# Patient Record
Sex: MALE | Race: OTHER | URBAN - METROPOLITAN AREA
[De-identification: names, ages, dates, MRNs, and addresses within clinical notes are randomized per-mention and may not be internally consistent; named-entity substitution may affect disease eponyms.]

---

## 2019-04-03 ENCOUNTER — EMERGENCY (EMERGENCY)
Facility: HOSPITAL | Age: 34
LOS: 0 days | Discharge: HOME | End: 2019-04-03
Attending: EMERGENCY MEDICINE | Admitting: EMERGENCY MEDICINE
Payer: MEDICAID

## 2019-04-03 VITALS
SYSTOLIC BLOOD PRESSURE: 136 MMHG | OXYGEN SATURATION: 100 % | HEART RATE: 65 BPM | TEMPERATURE: 98 F | DIASTOLIC BLOOD PRESSURE: 71 MMHG | RESPIRATION RATE: 18 BRPM

## 2019-04-03 VITALS — WEIGHT: 160.06 LBS | HEIGHT: 62 IN

## 2019-04-03 DIAGNOSIS — M54.5 LOW BACK PAIN: ICD-10-CM

## 2019-04-03 DIAGNOSIS — N30.00 ACUTE CYSTITIS WITHOUT HEMATURIA: ICD-10-CM

## 2019-04-03 LAB
APPEARANCE UR: CLEAR — SIGNIFICANT CHANGE UP
BILIRUB UR-MCNC: NEGATIVE — SIGNIFICANT CHANGE UP
COLOR SPEC: YELLOW — SIGNIFICANT CHANGE UP
DIFF PNL FLD: NEGATIVE — SIGNIFICANT CHANGE UP
GLUCOSE UR QL: NEGATIVE MG/DL — SIGNIFICANT CHANGE UP
KETONES UR-MCNC: NEGATIVE — SIGNIFICANT CHANGE UP
LEUKOCYTE ESTERASE UR-ACNC: ABNORMAL
NITRITE UR-MCNC: POSITIVE
PH UR: 5 — SIGNIFICANT CHANGE UP (ref 5–8)
PROT UR-MCNC: 15 MG/DL
SP GR SPEC: 1.02 — SIGNIFICANT CHANGE UP (ref 1.01–1.02)
UROBILINOGEN FLD QL: 1 MG/DL

## 2019-04-03 PROCEDURE — 99284 EMERGENCY DEPT VISIT MOD MDM: CPT | Mod: 25

## 2019-04-03 RX ORDER — CEFTRIAXONE 500 MG/1
250 INJECTION, POWDER, FOR SOLUTION INTRAMUSCULAR; INTRAVENOUS ONCE
Qty: 0 | Refills: 0 | Status: COMPLETED | OUTPATIENT
Start: 2019-04-03 | End: 2019-04-03

## 2019-04-03 RX ORDER — CYCLOBENZAPRINE HYDROCHLORIDE 10 MG/1
1 TABLET, FILM COATED ORAL
Qty: 5 | Refills: 0
Start: 2019-04-03 | End: 2019-04-07

## 2019-04-03 RX ORDER — IBUPROFEN 200 MG
1 TABLET ORAL
Qty: 20 | Refills: 0
Start: 2019-04-03 | End: 2019-04-07

## 2019-04-03 RX ORDER — LIDOCAINE 4 G/100G
1 CREAM TOPICAL ONCE
Qty: 0 | Refills: 0 | Status: COMPLETED | OUTPATIENT
Start: 2019-04-03 | End: 2019-04-03

## 2019-04-03 RX ORDER — KETOROLAC TROMETHAMINE 30 MG/ML
30 SYRINGE (ML) INJECTION ONCE
Qty: 0 | Refills: 0 | Status: DISCONTINUED | OUTPATIENT
Start: 2019-04-03 | End: 2019-04-03

## 2019-04-03 RX ADMIN — CEFTRIAXONE 250 MILLIGRAM(S): 500 INJECTION, POWDER, FOR SOLUTION INTRAMUSCULAR; INTRAVENOUS at 21:36

## 2019-04-03 RX ADMIN — Medication 30 MILLIGRAM(S): at 20:19

## 2019-04-03 RX ADMIN — LIDOCAINE 1 PATCH: 4 CREAM TOPICAL at 20:18

## 2019-04-03 NOTE — ED STATDOCS - ATTENDING CONTRIBUTION TO CARE
I, Mandie Dunn MD, personally saw the patient with ACP.  I have personally performed a face to face diagnostic evaluation on this patient.  I have reviewed the ACP note and agree with the history, exam, and plan of care, except as noted.

## 2019-04-03 NOTE — ED STATDOCS - CLINICAL SUMMARY MEDICAL DECISION MAKING FREE TEXT BOX
34 y/o m with left paraspinal lumbar pain, works in construction, will advise Motrin and f/u with PMD. No red flags, give strict return precautions.

## 2019-04-03 NOTE — ED STATDOCS - PROGRESS NOTE DETAILS
Patient seen and evaluated, ED attending note and orders reviewed, will continue with patient follow up and care -Monserrat Bland PA-C Pt feeling much better after medications, ambulating well.  Plan to d/c home with motrin, muscle relaxers (pt advised only to take muscle relaxers before bed, not while working or driving), f/u PMD.  Pt agreeable to d/c and plan of care, all questions answered, return precautions given  Monserrat Bland PA-C Pt feeling much better after medications, ambulating well.  UA with +notrates, trace leuks, pt denies dysuria, penile pain, discharge, states he only has sexual intercourse with his wife, will collect urine culture and test for GC, will give pt ceftriaxone and d/c home with abx. Plan to d/c home with motrin, muscle relaxers (pt advised only to take muscle relaxers before bed, not while working or driving), f/u PMD.  Pt agreeable to d/c and plan of care, all questions answered, return precautions given  Monserrat Bland PA-C

## 2019-04-03 NOTE — ED STATDOCS - OBJECTIVE STATEMENT
34 y/o m presenting to the ED c/o left lower back pain. Pain described as sharp. 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 Denies fever, chills, any other acute c/o. [place]  used, ID#: 34 y/o m presenting to the ED c/o left lower back pain. Pain described as sharp. 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 Denies fever, chills, incontinence, any other acute c/o. [place]  used, ID#:

## 2019-04-04 LAB
N GONORRHOEA RRNA SPEC QL NAA+PROBE: SIGNIFICANT CHANGE UP
SPECIMEN SOURCE: SIGNIFICANT CHANGE UP

## 2019-04-05 LAB
CULTURE RESULTS: NO GROWTH — SIGNIFICANT CHANGE UP
SPECIMEN SOURCE: SIGNIFICANT CHANGE UP

## 2019-07-20 ENCOUNTER — EMERGENCY (EMERGENCY)
Facility: HOSPITAL | Age: 34
LOS: 0 days | Discharge: HOME | End: 2019-07-20
Attending: EMERGENCY MEDICINE | Admitting: EMERGENCY MEDICINE
Payer: MEDICAID

## 2019-07-20 VITALS — WEIGHT: 160.06 LBS | HEIGHT: 67 IN

## 2019-07-20 VITALS
RESPIRATION RATE: 18 BRPM | TEMPERATURE: 98 F | DIASTOLIC BLOOD PRESSURE: 72 MMHG | SYSTOLIC BLOOD PRESSURE: 116 MMHG | HEART RATE: 73 BPM | OXYGEN SATURATION: 96 %

## 2019-07-20 DIAGNOSIS — R10.9 UNSPECIFIED ABDOMINAL PAIN: ICD-10-CM

## 2019-07-20 DIAGNOSIS — K52.9 NONINFECTIVE GASTROENTERITIS AND COLITIS, UNSPECIFIED: ICD-10-CM

## 2019-07-20 DIAGNOSIS — R50.9 FEVER, UNSPECIFIED: ICD-10-CM

## 2019-07-20 DIAGNOSIS — R19.7 DIARRHEA, UNSPECIFIED: ICD-10-CM

## 2019-07-20 LAB
ALBUMIN SERPL ELPH-MCNC: 3.6 G/DL — SIGNIFICANT CHANGE UP (ref 3.3–5)
ALP SERPL-CCNC: 58 U/L — SIGNIFICANT CHANGE UP (ref 40–120)
ALT FLD-CCNC: 36 U/L — SIGNIFICANT CHANGE UP (ref 12–78)
ANION GAP SERPL CALC-SCNC: 11 MMOL/L — SIGNIFICANT CHANGE UP (ref 5–17)
APTT BLD: 32.2 SEC — SIGNIFICANT CHANGE UP (ref 27.5–36.3)
AST SERPL-CCNC: 17 U/L — SIGNIFICANT CHANGE UP (ref 15–37)
BASOPHILS # BLD AUTO: 0.05 K/UL — SIGNIFICANT CHANGE UP (ref 0–0.2)
BASOPHILS NFR BLD AUTO: 0.3 % — SIGNIFICANT CHANGE UP (ref 0–2)
BILIRUB SERPL-MCNC: 0.9 MG/DL — SIGNIFICANT CHANGE UP (ref 0.2–1.2)
BLD GP AB SCN SERPL QL: SIGNIFICANT CHANGE UP
BUN SERPL-MCNC: 9 MG/DL — SIGNIFICANT CHANGE UP (ref 7–23)
CALCIUM SERPL-MCNC: 8.5 MG/DL — SIGNIFICANT CHANGE UP (ref 8.5–10.1)
CHLORIDE SERPL-SCNC: 103 MMOL/L — SIGNIFICANT CHANGE UP (ref 96–108)
CO2 SERPL-SCNC: 23 MMOL/L — SIGNIFICANT CHANGE UP (ref 22–31)
CREAT SERPL-MCNC: 1.33 MG/DL — HIGH (ref 0.5–1.3)
EOSINOPHIL # BLD AUTO: 0.01 K/UL — SIGNIFICANT CHANGE UP (ref 0–0.5)
EOSINOPHIL NFR BLD AUTO: 0.1 % — SIGNIFICANT CHANGE UP (ref 0–6)
GLUCOSE SERPL-MCNC: 151 MG/DL — HIGH (ref 70–99)
HCT VFR BLD CALC: 42.8 % — SIGNIFICANT CHANGE UP (ref 39–50)
HGB BLD-MCNC: 14.8 G/DL — SIGNIFICANT CHANGE UP (ref 13–17)
IMM GRANULOCYTES NFR BLD AUTO: 0.6 % — SIGNIFICANT CHANGE UP (ref 0–1.5)
INR BLD: 1.27 RATIO — HIGH (ref 0.88–1.16)
LIDOCAIN IGE QN: 68 U/L — LOW (ref 73–393)
LYMPHOCYTES # BLD AUTO: 1.67 K/UL — SIGNIFICANT CHANGE UP (ref 1–3.3)
LYMPHOCYTES # BLD AUTO: 10.3 % — LOW (ref 13–44)
MCHC RBC-ENTMCNC: 30.5 PG — SIGNIFICANT CHANGE UP (ref 27–34)
MCHC RBC-ENTMCNC: 34.6 GM/DL — SIGNIFICANT CHANGE UP (ref 32–36)
MCV RBC AUTO: 88.2 FL — SIGNIFICANT CHANGE UP (ref 80–100)
MONOCYTES # BLD AUTO: 0.65 K/UL — SIGNIFICANT CHANGE UP (ref 0–0.9)
MONOCYTES NFR BLD AUTO: 4 % — SIGNIFICANT CHANGE UP (ref 2–14)
NEUTROPHILS # BLD AUTO: 13.81 K/UL — HIGH (ref 1.8–7.4)
NEUTROPHILS NFR BLD AUTO: 84.7 % — HIGH (ref 43–77)
PLATELET # BLD AUTO: 260 K/UL — SIGNIFICANT CHANGE UP (ref 150–400)
POTASSIUM SERPL-MCNC: 3.6 MMOL/L — SIGNIFICANT CHANGE UP (ref 3.5–5.3)
POTASSIUM SERPL-SCNC: 3.6 MMOL/L — SIGNIFICANT CHANGE UP (ref 3.5–5.3)
PROT SERPL-MCNC: 7.3 GM/DL — SIGNIFICANT CHANGE UP (ref 6–8.3)
PROTHROM AB SERPL-ACNC: 14.2 SEC — HIGH (ref 10–12.9)
RBC # BLD: 4.85 M/UL — SIGNIFICANT CHANGE UP (ref 4.2–5.8)
RBC # FLD: 11.9 % — SIGNIFICANT CHANGE UP (ref 10.3–14.5)
SODIUM SERPL-SCNC: 137 MMOL/L — SIGNIFICANT CHANGE UP (ref 135–145)
WBC # BLD: 16.29 K/UL — HIGH (ref 3.8–10.5)
WBC # FLD AUTO: 16.29 K/UL — HIGH (ref 3.8–10.5)

## 2019-07-20 PROCEDURE — 99285 EMERGENCY DEPT VISIT HI MDM: CPT

## 2019-07-20 PROCEDURE — 74177 CT ABD & PELVIS W/CONTRAST: CPT | Mod: 26

## 2019-07-20 RX ORDER — ONDANSETRON 8 MG/1
4 TABLET, FILM COATED ORAL ONCE
Refills: 0 | Status: COMPLETED | OUTPATIENT
Start: 2019-07-20 | End: 2019-07-20

## 2019-07-20 RX ORDER — CIPROFLOXACIN LACTATE 400MG/40ML
400 VIAL (ML) INTRAVENOUS ONCE
Refills: 0 | Status: COMPLETED | OUTPATIENT
Start: 2019-07-20 | End: 2019-07-20

## 2019-07-20 RX ORDER — SODIUM CHLORIDE 9 MG/ML
1000 INJECTION INTRAMUSCULAR; INTRAVENOUS; SUBCUTANEOUS ONCE
Refills: 0 | Status: COMPLETED | OUTPATIENT
Start: 2019-07-20 | End: 2019-07-20

## 2019-07-20 RX ORDER — KETOROLAC TROMETHAMINE 30 MG/ML
30 SYRINGE (ML) INJECTION ONCE
Refills: 0 | Status: DISCONTINUED | OUTPATIENT
Start: 2019-07-20 | End: 2019-07-20

## 2019-07-20 RX ORDER — MOXIFLOXACIN HYDROCHLORIDE TABLETS, 400 MG 400 MG/1
1 TABLET, FILM COATED ORAL
Qty: 20 | Refills: 0
Start: 2019-07-20 | End: 2019-07-29

## 2019-07-20 RX ORDER — METRONIDAZOLE 500 MG
500 TABLET ORAL ONCE
Refills: 0 | Status: COMPLETED | OUTPATIENT
Start: 2019-07-20 | End: 2019-07-20

## 2019-07-20 RX ORDER — METRONIDAZOLE 500 MG
1 TABLET ORAL
Qty: 30 | Refills: 0
Start: 2019-07-20 | End: 2019-07-29

## 2019-07-20 RX ADMIN — SODIUM CHLORIDE 1000 MILLILITER(S): 9 INJECTION INTRAMUSCULAR; INTRAVENOUS; SUBCUTANEOUS at 19:13

## 2019-07-20 RX ADMIN — Medication 30 MILLIGRAM(S): at 19:14

## 2019-07-20 RX ADMIN — Medication 200 MILLIGRAM(S): at 21:15

## 2019-07-20 RX ADMIN — Medication 30 MILLIGRAM(S): at 19:45

## 2019-07-20 RX ADMIN — Medication 100 MILLIGRAM(S): at 20:28

## 2019-07-20 RX ADMIN — ONDANSETRON 4 MILLIGRAM(S): 8 TABLET, FILM COATED ORAL at 19:13

## 2019-07-20 RX ADMIN — SODIUM CHLORIDE 1000 MILLILITER(S): 9 INJECTION INTRAMUSCULAR; INTRAVENOUS; SUBCUTANEOUS at 20:14

## 2019-07-20 RX ADMIN — Medication 500 MILLIGRAM(S): at 21:14

## 2019-07-20 NOTE — ED ADULT NURSE NOTE - CHPI ED NUR SYMPTOMS NEG
no diarrhea/no abdominal distension/no dysuria/no burning urination/no vomiting/no hematuria/no chills/no blood in stool

## 2019-07-20 NOTE — ED STATDOCS - OBJECTIVE STATEMENT
32 y/o male with no pertinent PMHx presents to the ED c/o left sided abdominal pain, V/D since yesterday. +fever, +generalized body aches. Went to the fair 2 days ago and ate food there so wife was suspicious of food poisoning. Has been taking Motrin and Tylenol as needed, last took Tylenol at 1400. No recent sick contacts. NKDA. PMD: [facility].

## 2019-07-20 NOTE — ED STATDOCS - GASTROINTESTINAL, MLM
abdomen soft, and non-distended. Bowel sounds present. +TTP RUQ, LUQ, and LLQ, no guarding or rebound

## 2019-07-20 NOTE — ED ADULT TRIAGE NOTE - CHIEF COMPLAINT QUOTE
Pt came to the ED for eval of abd pain, diarrhea, subjective fevers and generalized body aches since yesterday. Wife states she has been giving pt tylenol/motrin ATC, last dose of tylenol was at 2pm. Pt with no previous medical hx.

## 2019-07-20 NOTE — ED STATDOCS - ATTENDING CONTRIBUTION TO CARE
I performed the initial face to face bedside interview with this patient regarding history of present illness, review of symptoms and past medical, social and family history.  I completed an independent physical examination.  I was the initial provider who evaluated this patient.  The history, review of symptoms and examination was documented by the PA in my presence and I attest to the accuracy of the documentation.  I have signed out the follow up of any pending tests (i.e. labs, radiological studies) to the PA.  I have discussed the patient’s plan of care and disposition with the PA.    I performed the initial face to face bedside interview with this patient regarding history of present illness, review of symptoms and past medical, social and family history.  I completed an independent physical examination.  I was the initial provider who evaluated this patient.  The history, review of symptoms and examination was documented by the NP in my presence and I attest to the accuracy of the documentation.  I have signed out the follow up of any pending tests (i.e. labs, radiological studies) to the NP.  I have discussed the patient’s plan of care and disposition with the NP.

## 2019-07-20 NOTE — ED ADULT NURSE NOTE - NSFALLRSKUNASSIST_ED_ALL_ED
Patients wife called and scheduled and appointment for [facility] tomorrow for a [facility] he is still having symptoms   Coughing, body aches, and not sure if he still has a fever or not and he wanted to know if you can send over the     guaifenesin-codeine (GUAIFENESIN AC) 100-10 MG/5ML liquid Sig: Take 5 mL by mouth 3 (three) times a day as needed for cough or congestion        Pharmacy is [facility] on [address] no

## 2019-07-20 NOTE — ED STATDOCS - PROGRESS NOTE DETAILS
33 yr. old male PMH: presents to ED with left abdominal pain nausea vomiting and diarrhea onset yesterday after having Tacos on Thursday. +fever + body aches.  Took Motrin and Tylenol and vomited. Seen and examined by attending in Intake. PLan: IV, IVF, labs CT abd./pelvis pain medication and Zofran. Will F/U with results and re evaluate. Lcuy NP 33 yr. old male PMH: presents to ED with left abdominal pain nausea vomiting and diarrhea onset yesterday after having Tacos on Thursday. +fever + body aches.  Took Motrin and Tylenol and vomited. Seen and examined by attending in Intake. Plan: IV, IVF, labs CT abd./pelvis pain medication and Zofran. Will F/U with results and re evaluate. Lucy NP Results of Lab work and CT abd./pelvis revealing Colitis discussed with patient. Will send Rx. for Cipro and Flagyl and instructed patient to take a pro biotic or yogurt while on antibiotics. Instructed to avoid greasy spicy foods as well as alcohol. Lucy NP

## 2019-07-20 NOTE — ED STATDOCS - CLINICAL SUMMARY MEDICAL DECISION MAKING FREE TEXT BOX
Pt with colitis.  Given cipro, and flagyl, IVF.  Feeling better on reexam.  Tolerating PO.  Okay for d/c home with antibiotics, f/u with PCP, GI.

## 2019-10-03 ENCOUNTER — EMERGENCY (EMERGENCY)
Facility: HOSPITAL | Age: 34
LOS: 0 days | Discharge: HOME | End: 2019-10-03
Attending: EMERGENCY MEDICINE
Payer: MEDICAID

## 2019-10-03 VITALS
RESPIRATION RATE: 17 BRPM | OXYGEN SATURATION: 100 % | HEART RATE: 55 BPM | SYSTOLIC BLOOD PRESSURE: 125 MMHG | DIASTOLIC BLOOD PRESSURE: 70 MMHG

## 2019-10-03 VITALS
SYSTOLIC BLOOD PRESSURE: 138 MMHG | OXYGEN SATURATION: 100 % | DIASTOLIC BLOOD PRESSURE: 75 MMHG | HEART RATE: 64 BPM | TEMPERATURE: 98 F | RESPIRATION RATE: 16 BRPM

## 2019-10-03 DIAGNOSIS — F17.200 NICOTINE DEPENDENCE, UNSPECIFIED, UNCOMPLICATED: ICD-10-CM

## 2019-10-03 DIAGNOSIS — N39.0 URINARY TRACT INFECTION, SITE NOT SPECIFIED: ICD-10-CM

## 2019-10-03 DIAGNOSIS — M54.9 DORSALGIA, UNSPECIFIED: ICD-10-CM

## 2019-10-03 LAB
ALBUMIN SERPL ELPH-MCNC: 4.2 G/DL — SIGNIFICANT CHANGE UP (ref 3.3–5)
ALP SERPL-CCNC: 62 U/L — SIGNIFICANT CHANGE UP (ref 40–120)
ALT FLD-CCNC: 58 U/L — SIGNIFICANT CHANGE UP (ref 12–78)
ANION GAP SERPL CALC-SCNC: 6 MMOL/L — SIGNIFICANT CHANGE UP (ref 5–17)
APPEARANCE UR: CLEAR — SIGNIFICANT CHANGE UP
AST SERPL-CCNC: 25 U/L — SIGNIFICANT CHANGE UP (ref 15–37)
BASOPHILS # BLD AUTO: 0.03 K/UL — SIGNIFICANT CHANGE UP (ref 0–0.2)
BASOPHILS NFR BLD AUTO: 0.3 % — SIGNIFICANT CHANGE UP (ref 0–2)
BILIRUB SERPL-MCNC: 0.5 MG/DL — SIGNIFICANT CHANGE UP (ref 0.2–1.2)
BILIRUB UR-MCNC: NEGATIVE — SIGNIFICANT CHANGE UP
BUN SERPL-MCNC: 16 MG/DL — SIGNIFICANT CHANGE UP (ref 7–23)
CALCIUM SERPL-MCNC: 9 MG/DL — SIGNIFICANT CHANGE UP (ref 8.5–10.1)
CHLORIDE SERPL-SCNC: 106 MMOL/L — SIGNIFICANT CHANGE UP (ref 96–108)
CO2 SERPL-SCNC: 27 MMOL/L — SIGNIFICANT CHANGE UP (ref 22–31)
COLOR SPEC: YELLOW — SIGNIFICANT CHANGE UP
CREAT SERPL-MCNC: 0.93 MG/DL — SIGNIFICANT CHANGE UP (ref 0.5–1.3)
DIFF PNL FLD: ABNORMAL
EOSINOPHIL # BLD AUTO: 0.29 K/UL — SIGNIFICANT CHANGE UP (ref 0–0.5)
EOSINOPHIL NFR BLD AUTO: 2.8 % — SIGNIFICANT CHANGE UP (ref 0–6)
GLUCOSE SERPL-MCNC: 100 MG/DL — HIGH (ref 70–99)
GLUCOSE UR QL: NEGATIVE MG/DL — SIGNIFICANT CHANGE UP
HCT VFR BLD CALC: 43.3 % — SIGNIFICANT CHANGE UP (ref 39–50)
HGB BLD-MCNC: 15 G/DL — SIGNIFICANT CHANGE UP (ref 13–17)
IMM GRANULOCYTES NFR BLD AUTO: 0.4 % — SIGNIFICANT CHANGE UP (ref 0–1.5)
KETONES UR-MCNC: ABNORMAL
LEUKOCYTE ESTERASE UR-ACNC: ABNORMAL
LIDOCAIN IGE QN: 100 U/L — SIGNIFICANT CHANGE UP (ref 73–393)
LYMPHOCYTES # BLD AUTO: 5.15 K/UL — HIGH (ref 1–3.3)
LYMPHOCYTES # BLD AUTO: 50.2 % — HIGH (ref 13–44)
MCHC RBC-ENTMCNC: 30.2 PG — SIGNIFICANT CHANGE UP (ref 27–34)
MCHC RBC-ENTMCNC: 34.6 GM/DL — SIGNIFICANT CHANGE UP (ref 32–36)
MCV RBC AUTO: 87.3 FL — SIGNIFICANT CHANGE UP (ref 80–100)
MONOCYTES # BLD AUTO: 0.6 K/UL — SIGNIFICANT CHANGE UP (ref 0–0.9)
MONOCYTES NFR BLD AUTO: 5.8 % — SIGNIFICANT CHANGE UP (ref 2–14)
NEUTROPHILS # BLD AUTO: 4.15 K/UL — SIGNIFICANT CHANGE UP (ref 1.8–7.4)
NEUTROPHILS NFR BLD AUTO: 40.5 % — LOW (ref 43–77)
NITRITE UR-MCNC: POSITIVE
PH UR: 5 — SIGNIFICANT CHANGE UP (ref 5–8)
PLATELET # BLD AUTO: 389 K/UL — SIGNIFICANT CHANGE UP (ref 150–400)
POTASSIUM SERPL-MCNC: 4 MMOL/L — SIGNIFICANT CHANGE UP (ref 3.5–5.3)
POTASSIUM SERPL-SCNC: 4 MMOL/L — SIGNIFICANT CHANGE UP (ref 3.5–5.3)
PROT SERPL-MCNC: 7.7 GM/DL — SIGNIFICANT CHANGE UP (ref 6–8.3)
PROT UR-MCNC: 15 MG/DL
RBC # BLD: 4.96 M/UL — SIGNIFICANT CHANGE UP (ref 4.2–5.8)
RBC # FLD: 12.1 % — SIGNIFICANT CHANGE UP (ref 10.3–14.5)
SODIUM SERPL-SCNC: 139 MMOL/L — SIGNIFICANT CHANGE UP (ref 135–145)
SP GR SPEC: 1.02 — SIGNIFICANT CHANGE UP (ref 1.01–1.02)
UROBILINOGEN FLD QL: NEGATIVE MG/DL — SIGNIFICANT CHANGE UP
WBC # BLD: 10.26 K/UL — SIGNIFICANT CHANGE UP (ref 3.8–10.5)
WBC # FLD AUTO: 10.26 K/UL — SIGNIFICANT CHANGE UP (ref 3.8–10.5)

## 2019-10-03 PROCEDURE — 74177 CT ABD & PELVIS W/CONTRAST: CPT

## 2019-10-03 PROCEDURE — 96361 HYDRATE IV INFUSION ADD-ON: CPT

## 2019-10-03 PROCEDURE — 74177 CT ABD & PELVIS W/CONTRAST: CPT | Mod: 26

## 2019-10-03 PROCEDURE — 80053 COMPREHEN METABOLIC PANEL: CPT

## 2019-10-03 PROCEDURE — 87186 SC STD MICRODIL/AGAR DIL: CPT

## 2019-10-03 PROCEDURE — 81001 URINALYSIS AUTO W/SCOPE: CPT

## 2019-10-03 PROCEDURE — 36415 COLL VENOUS BLD VENIPUNCTURE: CPT

## 2019-10-03 PROCEDURE — 99284 EMERGENCY DEPT VISIT MOD MDM: CPT

## 2019-10-03 PROCEDURE — 85025 COMPLETE CBC W/AUTO DIFF WBC: CPT

## 2019-10-03 PROCEDURE — 99284 EMERGENCY DEPT VISIT MOD MDM: CPT | Mod: 25

## 2019-10-03 PROCEDURE — 83690 ASSAY OF LIPASE: CPT

## 2019-10-03 PROCEDURE — 96374 THER/PROPH/DIAG INJ IV PUSH: CPT | Mod: XU

## 2019-10-03 PROCEDURE — 87086 URINE CULTURE/COLONY COUNT: CPT

## 2019-10-03 RX ORDER — CEFTRIAXONE 500 MG/1
1 INJECTION, POWDER, FOR SOLUTION INTRAMUSCULAR; INTRAVENOUS ONCE
Refills: 0 | Status: COMPLETED | OUTPATIENT
Start: 2019-10-03 | End: 2019-10-03

## 2019-10-03 RX ORDER — SODIUM CHLORIDE 9 MG/ML
1000 INJECTION INTRAMUSCULAR; INTRAVENOUS; SUBCUTANEOUS ONCE
Refills: 0 | Status: COMPLETED | OUTPATIENT
Start: 2019-10-03 | End: 2019-10-03

## 2019-10-03 RX ORDER — CEFTRIAXONE 500 MG/1
1000 INJECTION, POWDER, FOR SOLUTION INTRAMUSCULAR; INTRAVENOUS ONCE
Refills: 0 | Status: COMPLETED | OUTPATIENT
Start: 2019-10-03 | End: 2019-10-03

## 2019-10-03 RX ORDER — CEPHALEXIN 500 MG
1 CAPSULE ORAL
Qty: 14 | Refills: 0
Start: 2019-10-03 | End: 2019-10-09

## 2019-10-03 RX ORDER — ACETAMINOPHEN 500 MG
1000 TABLET ORAL ONCE
Refills: 0 | Status: COMPLETED | OUTPATIENT
Start: 2019-10-03 | End: 2019-10-03

## 2019-10-03 RX ADMIN — SODIUM CHLORIDE 2000 MILLILITER(S): 9 INJECTION INTRAMUSCULAR; INTRAVENOUS; SUBCUTANEOUS at 18:17

## 2019-10-03 RX ADMIN — Medication 1000 MILLIGRAM(S): at 18:17

## 2019-10-03 RX ADMIN — CEFTRIAXONE 1000 MILLIGRAM(S): 500 INJECTION, POWDER, FOR SOLUTION INTRAMUSCULAR; INTRAVENOUS at 19:44

## 2019-10-03 RX ADMIN — Medication 1000 MILLIGRAM(S): at 19:44

## 2019-10-03 RX ADMIN — SODIUM CHLORIDE 1000 MILLILITER(S): 9 INJECTION INTRAMUSCULAR; INTRAVENOUS; SUBCUTANEOUS at 19:44

## 2019-10-03 NOTE — ED STATDOCS - OBJECTIVE STATEMENT
32 y/o male with PMHx of UTI, urethritis, and renal vascular disorder presents to the ED c/o +back pain x2 weeks. Back pain worsened last night causing him to not be able to sleep. Notes +foul smelling urine. Also reports left sided abd pain. Pt is currently feeling pain. No recent heavy lifting. Denies fever, N/V/D. No medications on daily basis. Has taken ibuprofen for the pain. Reports he came to ED 4 months ago for back pain but nothing remarkable was found. Smoker. NKDA.

## 2019-10-03 NOTE — ED ADULT NURSE NOTE - NSIMPLEMENTINTERV_GEN_ALL_ED
Implemented All Universal Safety Interventions:  [place] to call system. Call bell, personal items and telephone within reach. Instruct patient to call for assistance. Room bathroom lighting operational. Non-slip footwear when patient is off stretcher. Physically safe environment: no spills, clutter or unnecessary equipment. Stretcher in lowest position, wheels locked, appropriate side rails in place.

## 2019-10-03 NOTE — ED STATDOCS - PATIENT PORTAL LINK FT
You can access the FollowMyHealth Patient Portal offered by [facility] by registering at the following website: [email or web address]. By joining [employer]’s FollowMyHealth portal, you will also be able to view your health information using other applications (apps) compatible with our system.

## 2019-10-03 NOTE — ED ADULT TRIAGE NOTE - CHIEF COMPLAINT QUOTE
PT PRESENTS TO ED TRIAGE WITH C/O FLANK PAIN, PAINFUL URINATION, AND FOUL SMELLING URINE X 8 DAYS.  PT DENIES FEVERS.

## 2019-10-03 NOTE — ED STATDOCS - ATTENDING CONTRIBUTION TO CARE
I, Jt Peterson MD,  performed the initial face to face bedside interview with this patient regarding history of present illness, review of symptoms and relevant past medical, social and family history.  I completed an independent physical examination.  I was the initial provider who evaluated this patient. I have signed out the follow up of any pending tests (i.e. labs, radiological studies) to the ACP.  I have communicated the patient’s plan of care and disposition with the ACP.  The history, relevant review of systems, past medical and surgical history, medical decision making, and physical examination was documented by the scribe in my presence and I attest to the accuracy of the documentation.

## 2019-10-03 NOTE — ED STATDOCS - PROGRESS NOTE DETAILS
signed Susan Snow PA-C Pt seen initially in intake by Dr. Peterson   33M here with wife, c/o left sided back pain for a few days, worse last night and also noticed some burning with urination and odor. Pt had similar symptoms a few months ago, came to ER and followed up with his PMD who said he was "fine". Pt alert, NAD. Plan labs, UA, CT. Pt agrees with plan of  care. signed Susan Snow PA-C Pt declines  services.   +uti on UA. No significant findings on CT or labs. Will DC home with keflex, outpt f/u PMD. return precautions given. Pt feeling well at DC, agrees with DC and plan of care. Pt ambulates with ease unassisted in ED.

## 2019-10-06 NOTE — ED POST DISCHARGE NOTE - OTHER COMMUNICATION
Pt prescribed keflex. UC shows sensitivity to 1st generation ceph. ED treatment appropriate. -Vladislav Pinto PA-C

## 2020-01-04 ENCOUNTER — EMERGENCY (EMERGENCY)
Facility: HOSPITAL | Age: 35
LOS: 0 days | Discharge: HOME | End: 2020-01-04
Attending: EMERGENCY MEDICINE
Payer: MEDICAID

## 2020-01-04 VITALS
TEMPERATURE: 98 F | SYSTOLIC BLOOD PRESSURE: 132 MMHG | HEART RATE: 62 BPM | RESPIRATION RATE: 16 BRPM | OXYGEN SATURATION: 98 % | DIASTOLIC BLOOD PRESSURE: 79 MMHG

## 2020-01-04 VITALS — WEIGHT: 160.06 LBS | HEIGHT: 63 IN

## 2020-01-04 DIAGNOSIS — R10.30 LOWER ABDOMINAL PAIN, UNSPECIFIED: ICD-10-CM

## 2020-01-04 DIAGNOSIS — N13.5 CROSSING VESSEL AND STRICTURE OF URETER WITHOUT HYDRONEPHROSIS: ICD-10-CM

## 2020-01-04 DIAGNOSIS — R30.0 DYSURIA: ICD-10-CM

## 2020-01-04 DIAGNOSIS — N28.89 OTHER SPECIFIED DISORDERS OF KIDNEY AND URETER: ICD-10-CM

## 2020-01-04 DIAGNOSIS — N39.0 URINARY TRACT INFECTION, SITE NOT SPECIFIED: ICD-10-CM

## 2020-01-04 DIAGNOSIS — R14.0 ABDOMINAL DISTENSION (GASEOUS): ICD-10-CM

## 2020-01-04 DIAGNOSIS — B96.20 UNSPECIFIED ESCHERICHIA COLI [E. COLI] AS THE CAUSE OF DISEASES CLASSIFIED ELSEWHERE: ICD-10-CM

## 2020-01-04 LAB
APPEARANCE UR: CLEAR — SIGNIFICANT CHANGE UP
BILIRUB UR-MCNC: NEGATIVE — SIGNIFICANT CHANGE UP
COLOR SPEC: YELLOW — SIGNIFICANT CHANGE UP
DIFF PNL FLD: NEGATIVE — SIGNIFICANT CHANGE UP
GLUCOSE UR QL: NEGATIVE MG/DL — SIGNIFICANT CHANGE UP
KETONES UR-MCNC: NEGATIVE — SIGNIFICANT CHANGE UP
LEUKOCYTE ESTERASE UR-ACNC: NEGATIVE — SIGNIFICANT CHANGE UP
NITRITE UR-MCNC: POSITIVE
PH UR: 6.5 — SIGNIFICANT CHANGE UP (ref 5–8)
PROT UR-MCNC: NEGATIVE MG/DL — SIGNIFICANT CHANGE UP
SP GR SPEC: 1.01 — SIGNIFICANT CHANGE UP (ref 1.01–1.02)
UROBILINOGEN FLD QL: NEGATIVE MG/DL — SIGNIFICANT CHANGE UP

## 2020-01-04 PROCEDURE — 81001 URINALYSIS AUTO W/SCOPE: CPT

## 2020-01-04 PROCEDURE — 99283 EMERGENCY DEPT VISIT LOW MDM: CPT

## 2020-01-04 PROCEDURE — 87186 SC STD MICRODIL/AGAR DIL: CPT

## 2020-01-04 PROCEDURE — 87086 URINE CULTURE/COLONY COUNT: CPT

## 2020-01-04 RX ORDER — CEPHALEXIN 500 MG
1 CAPSULE ORAL
Qty: 14 | Refills: 0
Start: 2020-01-04 | End: 2020-01-10

## 2020-01-04 RX ORDER — IBUPROFEN 200 MG
600 TABLET ORAL ONCE
Refills: 0 | Status: COMPLETED | OUTPATIENT
Start: 2020-01-04 | End: 2020-01-04

## 2020-01-04 RX ORDER — CEPHALEXIN 500 MG
500 CAPSULE ORAL ONCE
Refills: 0 | Status: COMPLETED | OUTPATIENT
Start: 2020-01-04 | End: 2020-01-04

## 2020-01-04 RX ADMIN — Medication 500 MILLIGRAM(S): at 20:55

## 2020-01-04 RX ADMIN — Medication 600 MILLIGRAM(S): at 19:53

## 2020-01-04 NOTE — ED STATDOCS - PATIENT PORTAL LINK FT
You can access the FollowMyHealth Patient Portal offered by [facility] by registering at the following website: [email or web address]. By joining [employer]’s FollowMyHealth portal, you will also be able to view your health information using other applications (apps) compatible with our system.

## 2020-01-04 NOTE — ED ADULT NURSE NOTE - NSIMPLEMENTINTERV_GEN_ALL_ED
Implemented All Universal Safety Interventions:  [place] to call system. Call bell, personal items and telephone within reach. Instruct patient to call for assistance. Room bathroom lighting operational. Non-slip footwear when patient is off stretcher. Physically safe environment: no spills, clutter or unnecessary equipment. Stretcher in lowest position, wheels locked, appropriate side rails in place.

## 2020-01-04 NOTE — ED STATDOCS - CLINICAL SUMMARY MEDICAL DECISION MAKING FREE TEXT BOX
history of meatal stenosis with frequent UTIs. 1 week of suprapubic pain, suggestive of recurrent cystitis. No systemic signs or sx to sign pyonephritis. no other GI sx to suggest intra abd cause of sx. Will check urine, treat with abx, and pain control with Ansaids. history of meatal stenosis with frequent UTIs. 1 week of suprapubic pain, suggestive of recurrent cystitis. No systemic signs or sx to suggest pyelonephritis. no other GI sx to suggest intra abd cause of sx. Will check urine, treat with abx, and pain control with nsaids.

## 2020-01-04 NOTE — ED STATDOCS - OBJECTIVE STATEMENT
35 y/o male with a PMHx of frequent UTI, Renal vascular disease, Urethritis, presents to the ED c/o back pain, abd swelling, dysuria, and nausea for a week. Pt was to be seen by a practitioner for imaging in regards for back pain. Given medications had relief, but ran out of them. A week PTA pain began again. Can not recall anything that may exacerbate the sx onset. In the previous UTIs also has back pain. Has an appointment with a urologist in February 2020. Last Ibuprofen was yesterday night. 35 y/o male with a PMHx of frequent UTI, Renal vascular disease, Urethritis, presents to the ED c/o back pain, abd swelling, dysuria, and nausea for a week. Gradual onset of symptoms, constant, similar to previous UTIs in the past, no inciting event or exposure. Complains of crampy suprapubic pain and low back aches. No fevers or chills. No urinary retention. Due for follow up with urology for treatment of urethral stricture.

## 2020-01-04 NOTE — ED STATDOCS - PROGRESS NOTE DETAILS
Patient seen and evaluated s/p ua.  Nitrite positive.  post void residual is 87cc.  Will treat with keflex, he will follow up with his urologist -Renuka Crum PA-C

## 2020-01-05 PROBLEM — N28.89 OTHER SPECIFIED DISORDERS OF KIDNEY AND URETER: Chronic | Status: ACTIVE | Noted: 2019-10-06

## 2020-01-05 PROBLEM — N39.0 URINARY TRACT INFECTION, SITE NOT SPECIFIED: Chronic | Status: ACTIVE | Noted: 2019-10-06

## 2020-01-05 PROBLEM — N34.2 OTHER URETHRITIS: Chronic | Status: ACTIVE | Noted: 2019-10-06

## 2020-03-16 ENCOUNTER — EMERGENCY (EMERGENCY)
Facility: HOSPITAL | Age: 35
LOS: 0 days | Discharge: HOME | End: 2020-03-16
Attending: EMERGENCY MEDICINE
Payer: MEDICAID

## 2020-03-16 VITALS
HEART RATE: 70 BPM | RESPIRATION RATE: 17 BRPM | OXYGEN SATURATION: 99 % | DIASTOLIC BLOOD PRESSURE: 86 MMHG | SYSTOLIC BLOOD PRESSURE: 131 MMHG | TEMPERATURE: 99 F

## 2020-03-16 VITALS — HEIGHT: 63 IN | WEIGHT: 160.06 LBS

## 2020-03-16 DIAGNOSIS — R05 COUGH: ICD-10-CM

## 2020-03-16 DIAGNOSIS — Z87.440 PERSONAL HISTORY OF URINARY (TRACT) INFECTIONS: ICD-10-CM

## 2020-03-16 DIAGNOSIS — R50.9 FEVER, UNSPECIFIED: ICD-10-CM

## 2020-03-16 DIAGNOSIS — F17.210 NICOTINE DEPENDENCE, CIGARETTES, UNCOMPLICATED: ICD-10-CM

## 2020-03-16 DIAGNOSIS — R06.02 SHORTNESS OF BREATH: ICD-10-CM

## 2020-03-16 DIAGNOSIS — Z79.899 OTHER LONG TERM (CURRENT) DRUG THERAPY: ICD-10-CM

## 2020-03-16 PROCEDURE — 99284 EMERGENCY DEPT VISIT MOD MDM: CPT

## 2020-03-16 PROCEDURE — 99283 EMERGENCY DEPT VISIT LOW MDM: CPT

## 2020-03-16 PROCEDURE — U0001: CPT

## 2020-03-16 NOTE — ED STATDOCS - PATIENT PORTAL LINK FT
You can access the FollowMyHealth Patient Portal offered by [facility] by registering at the following website: [email or web address]. By joining [employer]’s FollowMyHealth portal, you will also be able to view your health information using other applications (apps) compatible with our system.

## 2020-03-16 NOTE — ED STATDOCS - OBJECTIVE STATEMENT
35 y/o male with PMHx renal vascular diease, urethritis, UTI presents to ED c/o cough and intermittent fevers for x3 weeks, progressively worsening. +Occasional SOB. Denies recent travel or known exposure to covid pt's. Had dayquil around 7PM tonight. +Smoker, smokes 3 cigarettes a day. Denies fevers. NKDA.

## 2020-03-16 NOTE — ED STATDOCS - ENMT, MLM
Nasal mucosa clear.  Mouth with normal mucosa  Throat has no vesicles, no oropharyngeal exudates and uvula is midline. +Slight redness to throat.

## 2020-03-18 LAB — SARS-COV-2 RNA SPEC QL NAA+PROBE: SIGNIFICANT CHANGE UP

## 2020-03-29 ENCOUNTER — EMERGENCY (EMERGENCY)
Facility: HOSPITAL | Age: 35
LOS: 0 days | Discharge: HOME | End: 2020-03-30
Attending: EMERGENCY MEDICINE
Payer: MEDICAID

## 2020-03-29 VITALS
OXYGEN SATURATION: 99 % | HEART RATE: 86 BPM | RESPIRATION RATE: 17 BRPM | DIASTOLIC BLOOD PRESSURE: 85 MMHG | SYSTOLIC BLOOD PRESSURE: 139 MMHG | TEMPERATURE: 103 F

## 2020-03-29 VITALS — WEIGHT: 160.06 LBS | HEIGHT: 63 IN

## 2020-03-29 DIAGNOSIS — R50.9 FEVER, UNSPECIFIED: ICD-10-CM

## 2020-03-29 DIAGNOSIS — Z87.440 PERSONAL HISTORY OF URINARY (TRACT) INFECTIONS: ICD-10-CM

## 2020-03-29 DIAGNOSIS — N28.9 DISORDER OF KIDNEY AND URETER, UNSPECIFIED: ICD-10-CM

## 2020-03-29 DIAGNOSIS — R05 COUGH: ICD-10-CM

## 2020-03-29 DIAGNOSIS — B34.9 VIRAL INFECTION, UNSPECIFIED: ICD-10-CM

## 2020-03-29 PROCEDURE — 99283 EMERGENCY DEPT VISIT LOW MDM: CPT

## 2020-03-29 RX ORDER — ACETAMINOPHEN 500 MG
1000 TABLET ORAL ONCE
Refills: 0 | Status: COMPLETED | OUTPATIENT
Start: 2020-03-29 | End: 2020-03-29

## 2020-03-29 NOTE — ED ADULT TRIAGE NOTE - CHIEF COMPLAINT QUOTE
Pt comes in for subjective fevers x4 days. Pt never took temperature at home and did not take tylenol. States he has a little cough. Denies SOB. No sick contacts and no travel.

## 2020-03-30 RX ORDER — ACETAMINOPHEN 500 MG
2 TABLET ORAL
Qty: 30 | Refills: 0
Start: 2020-03-30

## 2020-03-30 RX ADMIN — Medication 1000 MILLIGRAM(S): at 00:08

## 2020-03-30 RX ADMIN — Medication 1200 MILLIGRAM(S): at 00:08

## 2020-03-30 NOTE — ED ADULT NURSE NOTE - NSIMPLEMENTINTERV_GEN_ALL_ED
Implemented All Universal Safety Interventions:  [place] to call system. Call bell, personal items and telephone within reach. Instruct patient to call for assistance. Room bathroom lighting operational. Non-slip footwear when patient is off stretcher. Physically safe environment: no spills, clutter or unnecessary equipment. Stretcher in lowest position, wheels locked, appropriate side rails in place.

## 2020-03-30 NOTE — ED PROVIDER NOTE - NSFOLLOWUPINSTRUCTIONS_ED_ALL_ED_FT
Keep hydrated.    medications at [facility] [address] and take as prescribed.    Isolate self in home and avoid contact with others for 2 weeks.     Viral Respiratory Infection  A viral respiratory infection is an illness that affects parts of the body used for breathing, like the lungs, nose, and throat. It is caused by a germ called a virus.    ImageSome examples of this kind of infection are:    A cold.  The flu (influenza).  A respiratory syncytial virus (RSV) infection.    How do I know if I have this infection?  Most of the time this infection causes:    A stuffy or runny nose.  Yellow or green fluid in the nose.  A cough.  Sneezing.  Tiredness (fatigue).  Achy muscles.  A sore throat.  Sweating or chills.  A fever.  A headache.    How is this infection treated?  If the flu is diagnosed early, it may be treated with an antiviral medicine. This medicine shortens the length of time a person has symptoms. Symptoms may be treated with over-the-counter and prescription medicines, such as:    Expectorants. These make it easier to cough up mucus.  Decongestant nasal sprays.    Doctors do not prescribe antibiotic medicines for viral infections. They do not work with this kind of infection.    How do I know if I should stay home?  To keep others from getting sick, stay home if you have:    A fever.  A lasting cough.  A sore throat.  A runny nose.  Sneezing.  Muscles aches.  Headaches.  Tiredness.  Weakness.  Chills.  Sweating.  An upset stomach (nausea).    Follow these instructions at home:  Rest as much as possible.  Take over-the-counter and prescription medicines only as told by your doctor.  Drink enough fluid to keep your pee (urine) clear or pale yellow.  Gargle with salt water. Do this 3–4 times per day or as needed. To make a salt–water mixture, dissolve ½–1 tsp of salt in 1 cup of warm water. Make sure the salt dissolves all the way.  Use nose drops made from salt water. This helps with stuffiness (congestion). It also helps soften the skin around your nose.  Do not drink alcohol.  Do not use tobacco products, including cigarettes, chewing tobacco, and e-cigarettes. If you need help quitting, ask your doctor.  Get help if:  Your symptoms last for 10 days or longer.  Your symptoms get worse over time.  You have a fever.  You have very bad pain in your face or forehead.  Parts of your jaw or neck become very swollen.  Get help right away if:  You feel pain or pressure in your chest.  You have shortness of breath.  You faint or feel like you will faint.  You keep throwing up (vomiting).  You feel confused.  This information is not intended to replace advice given to you by your health care provider. Make sure you discuss any questions you have with your health care provider.

## 2020-03-30 NOTE — ED PROVIDER NOTE - PATIENT PORTAL LINK FT
You can access the FollowMyHealth Patient Portal offered by [facility] by registering at the following website: [email or web address]. By joining [employer]’s FollowMyHealth portal, you will also be able to view your health information using other applications (apps) compatible with our system.

## 2020-03-30 NOTE — ED PROVIDER NOTE - NSFOLLOWUPCLINICS_GEN_ALL_ED_FT
[facility]  Family Medicine  [address] [place] [address]  Phone: [phone]  Fax:   Follow Up Time:

## 2020-03-30 NOTE — ED ADULT NURSE NOTE - OBJECTIVE STATEMENT
pt ambulatory to ED, A&O x3. c/o fever, cough starting 3 days ago. pt previously tested negative for COVID19 but was asymptomatic at the time of test. denies SOB, chest pain. pt breathing even and unlabored, O2 sat 99% RA.

## 2021-02-12 DIAGNOSIS — Z01.818 ENCOUNTER FOR OTHER PREPROCEDURAL EXAMINATION: ICD-10-CM

## 2021-02-13 ENCOUNTER — APPOINTMENT (OUTPATIENT)
Dept: DISASTER EMERGENCY | Facility: CLINIC | Age: 36
End: 2021-02-13

## 2021-03-01 ENCOUNTER — APPOINTMENT (OUTPATIENT)
Dept: DISASTER EMERGENCY | Facility: CLINIC | Age: 36
End: 2021-03-01

## 2021-03-01 DIAGNOSIS — Z01.818 ENCOUNTER FOR OTHER PREPROCEDURAL EXAMINATION: ICD-10-CM

## 2021-03-02 LAB — SARS-COV-2 N GENE NPH QL NAA+PROBE: NOT DETECTED

## 2021-03-08 ENCOUNTER — APPOINTMENT (OUTPATIENT)
Dept: DISASTER EMERGENCY | Facility: CLINIC | Age: 36
End: 2021-03-08

## 2021-03-09 LAB — SARS-COV-2 N GENE NPH QL NAA+PROBE: NOT DETECTED

## 2021-03-11 ENCOUNTER — OUTPATIENT (OUTPATIENT)
Dept: INPATIENT UNIT | Facility: HOSPITAL | Age: 36
LOS: 1 days | Discharge: HOME | End: 2021-03-11
Payer: MEDICAID

## 2021-03-11 VITALS
HEART RATE: 58 BPM | RESPIRATION RATE: 16 BRPM | SYSTOLIC BLOOD PRESSURE: 123 MMHG | TEMPERATURE: 98 F | HEIGHT: 66 IN | WEIGHT: 160.06 LBS | OXYGEN SATURATION: 100 % | DIASTOLIC BLOOD PRESSURE: 87 MMHG

## 2021-03-11 VITALS
TEMPERATURE: 97 F | DIASTOLIC BLOOD PRESSURE: 73 MMHG | RESPIRATION RATE: 16 BRPM | OXYGEN SATURATION: 100 % | HEART RATE: 62 BPM | SYSTOLIC BLOOD PRESSURE: 126 MMHG

## 2021-03-11 DIAGNOSIS — N35.919 UNSPECIFIED URETHRAL STRICTURE, MALE, UNSPECIFIED SITE: ICD-10-CM

## 2021-03-11 DIAGNOSIS — Z90.79 ACQUIRED ABSENCE OF OTHER GENITAL ORGAN(S): Chronic | ICD-10-CM

## 2021-03-11 PROCEDURE — 87077 CULTURE AEROBIC IDENTIFY: CPT

## 2021-03-11 PROCEDURE — 87186 SC STD MICRODIL/AGAR DIL: CPT

## 2021-03-11 PROCEDURE — 87086 URINE CULTURE/COLONY COUNT: CPT

## 2021-03-11 RX ORDER — FENOFIBRATE,MICRONIZED 130 MG
145 CAPSULE ORAL DAILY
Refills: 0 | Status: DISCONTINUED | OUTPATIENT
Start: 2021-03-11 | End: 2021-03-11

## 2021-03-11 RX ORDER — SODIUM CHLORIDE 9 MG/ML
3 INJECTION INTRAMUSCULAR; INTRAVENOUS; SUBCUTANEOUS EVERY 8 HOURS
Refills: 0 | Status: DISCONTINUED | OUTPATIENT
Start: 2021-03-11 | End: 2021-03-11

## 2021-03-11 RX ORDER — FENOFIBRATE,MICRONIZED 130 MG
1 CAPSULE ORAL
Qty: 0 | Refills: 0 | DISCHARGE

## 2021-03-11 RX ORDER — OXYCODONE HYDROCHLORIDE 5 MG/1
5 TABLET ORAL ONCE
Refills: 0 | Status: DISCONTINUED | OUTPATIENT
Start: 2021-03-11 | End: 2021-03-11

## 2021-03-11 RX ORDER — ONDANSETRON 8 MG/1
4 TABLET, FILM COATED ORAL EVERY 6 HOURS
Refills: 0 | Status: DISCONTINUED | OUTPATIENT
Start: 2021-03-11 | End: 2021-03-11

## 2021-03-11 RX ORDER — ACETAMINOPHEN 500 MG
975 TABLET ORAL ONCE
Refills: 0 | Status: COMPLETED | OUTPATIENT
Start: 2021-03-11 | End: 2021-03-11

## 2021-03-11 RX ORDER — FENTANYL CITRATE 50 UG/ML
50 INJECTION INTRAVENOUS
Refills: 0 | Status: DISCONTINUED | OUTPATIENT
Start: 2021-03-11 | End: 2021-03-11

## 2021-03-11 RX ORDER — PANTOPRAZOLE SODIUM 20 MG/1
1 TABLET, DELAYED RELEASE ORAL
Qty: 0 | Refills: 0 | DISCHARGE

## 2021-03-11 RX ORDER — FAMOTIDINE 10 MG/ML
20 INJECTION INTRAVENOUS ONCE
Refills: 0 | Status: COMPLETED | OUTPATIENT
Start: 2021-03-11 | End: 2021-03-11

## 2021-03-11 RX ORDER — SODIUM CHLORIDE 9 MG/ML
1000 INJECTION, SOLUTION INTRAVENOUS
Refills: 0 | Status: DISCONTINUED | OUTPATIENT
Start: 2021-03-11 | End: 2021-03-11

## 2021-03-11 RX ADMIN — Medication 975 MILLIGRAM(S): at 13:10

## 2021-03-11 RX ADMIN — OXYCODONE HYDROCHLORIDE 5 MILLIGRAM(S): 5 TABLET ORAL at 16:41

## 2021-03-11 RX ADMIN — Medication 975 MILLIGRAM(S): at 13:09

## 2021-03-11 RX ADMIN — OXYCODONE HYDROCHLORIDE 5 MILLIGRAM(S): 5 TABLET ORAL at 16:33

## 2021-03-11 RX ADMIN — FAMOTIDINE 20 MILLIGRAM(S): 10 INJECTION INTRAVENOUS at 13:09

## 2021-03-11 RX ADMIN — SODIUM CHLORIDE 75 MILLILITER(S): 9 INJECTION, SOLUTION INTRAVENOUS at 16:38

## 2021-03-11 NOTE — BRIEF OPERATIVE NOTE - NSICDXBRIEFPROCEDURE_GEN_ALL_CORE_FT
PROCEDURES:  Cystoscopy, with direct vision internal urethrotomy 11-Mar-2021 15:31:41  Rohith Slaughter

## 2021-03-11 NOTE — BRIEF OPERATIVE NOTE - NSICDXBRIEFPREOP_GEN_ALL_CORE_FT
PRE-OP DIAGNOSIS:  Male hypospadias 11-Mar-2021 15:32:33  Rohith Slaughter  Urethral stricture in male 11-Mar-2021 15:32:19  Rohith Slaughter

## 2021-03-18 DIAGNOSIS — N39.0 URINARY TRACT INFECTION, SITE NOT SPECIFIED: ICD-10-CM

## 2021-03-18 DIAGNOSIS — Z90.79 ACQUIRED ABSENCE OF OTHER GENITAL ORGAN(S): ICD-10-CM

## 2021-03-18 DIAGNOSIS — N47.5 ADHESIONS OF PREPUCE AND GLANS PENIS: ICD-10-CM

## 2021-03-18 DIAGNOSIS — Q54.9 HYPOSPADIAS, UNSPECIFIED: ICD-10-CM

## 2021-03-18 DIAGNOSIS — N35.911 UNSPECIFIED URETHRAL STRICTURE, MALE, MEATAL: ICD-10-CM

## 2021-03-18 DIAGNOSIS — N32.89 OTHER SPECIFIED DISORDERS OF BLADDER: ICD-10-CM

## 2021-03-19 ENCOUNTER — EMERGENCY (EMERGENCY)
Facility: HOSPITAL | Age: 36
LOS: 0 days | Discharge: HOME | End: 2021-03-19
Attending: EMERGENCY MEDICINE
Payer: MEDICAID

## 2021-03-19 VITALS — WEIGHT: 164.91 LBS | HEIGHT: 66 IN

## 2021-03-19 VITALS
SYSTOLIC BLOOD PRESSURE: 146 MMHG | RESPIRATION RATE: 19 BRPM | HEART RATE: 69 BPM | DIASTOLIC BLOOD PRESSURE: 79 MMHG | OXYGEN SATURATION: 100 % | TEMPERATURE: 98 F

## 2021-03-19 DIAGNOSIS — M54.30 SCIATICA, UNSPECIFIED SIDE: ICD-10-CM

## 2021-03-19 DIAGNOSIS — M25.561 PAIN IN RIGHT KNEE: ICD-10-CM

## 2021-03-19 DIAGNOSIS — Z90.79 ACQUIRED ABSENCE OF OTHER GENITAL ORGAN(S): Chronic | ICD-10-CM

## 2021-03-19 DIAGNOSIS — E78.5 HYPERLIPIDEMIA, UNSPECIFIED: ICD-10-CM

## 2021-03-19 DIAGNOSIS — M54.5 LOW BACK PAIN: ICD-10-CM

## 2021-03-19 DIAGNOSIS — N39.0 URINARY TRACT INFECTION, SITE NOT SPECIFIED: ICD-10-CM

## 2021-03-19 DIAGNOSIS — Z79.2 LONG TERM (CURRENT) USE OF ANTIBIOTICS: ICD-10-CM

## 2021-03-19 DIAGNOSIS — Z11.3 ENCOUNTER FOR SCREENING FOR INFECTIONS WITH A PREDOMINANTLY SEXUAL MODE OF TRANSMISSION: ICD-10-CM

## 2021-03-19 DIAGNOSIS — Z90.79 ACQUIRED ABSENCE OF OTHER GENITAL ORGAN(S): ICD-10-CM

## 2021-03-19 DIAGNOSIS — N50.819 TESTICULAR PAIN, UNSPECIFIED: ICD-10-CM

## 2021-03-19 DIAGNOSIS — I99.9 UNSPECIFIED DISORDER OF CIRCULATORY SYSTEM: ICD-10-CM

## 2021-03-19 LAB
APPEARANCE UR: CLEAR — SIGNIFICANT CHANGE UP
BILIRUB UR-MCNC: NEGATIVE — SIGNIFICANT CHANGE UP
COLOR SPEC: YELLOW — SIGNIFICANT CHANGE UP
DIFF PNL FLD: NEGATIVE — SIGNIFICANT CHANGE UP
GLUCOSE UR QL: NEGATIVE MG/DL — SIGNIFICANT CHANGE UP
KETONES UR-MCNC: NEGATIVE — SIGNIFICANT CHANGE UP
LEUKOCYTE ESTERASE UR-ACNC: ABNORMAL
NITRITE UR-MCNC: NEGATIVE — SIGNIFICANT CHANGE UP
PH UR: 7 — SIGNIFICANT CHANGE UP (ref 5–8)
PROT UR-MCNC: NEGATIVE MG/DL — SIGNIFICANT CHANGE UP
SP GR SPEC: 1.01 — SIGNIFICANT CHANGE UP (ref 1.01–1.02)
UROBILINOGEN FLD QL: NEGATIVE MG/DL — SIGNIFICANT CHANGE UP

## 2021-03-19 PROCEDURE — 76870 US EXAM SCROTUM: CPT

## 2021-03-19 PROCEDURE — 81001 URINALYSIS AUTO W/SCOPE: CPT

## 2021-03-19 PROCEDURE — 87591 N.GONORRHOEAE DNA AMP PROB: CPT

## 2021-03-19 PROCEDURE — 87077 CULTURE AEROBIC IDENTIFY: CPT

## 2021-03-19 PROCEDURE — 87086 URINE CULTURE/COLONY COUNT: CPT

## 2021-03-19 PROCEDURE — 87186 SC STD MICRODIL/AGAR DIL: CPT

## 2021-03-19 PROCEDURE — 87491 CHLMYD TRACH DNA AMP PROBE: CPT

## 2021-03-19 PROCEDURE — 76870 US EXAM SCROTUM: CPT | Mod: 26

## 2021-03-19 PROCEDURE — 99284 EMERGENCY DEPT VISIT MOD MDM: CPT | Mod: 25

## 2021-03-19 PROCEDURE — 99284 EMERGENCY DEPT VISIT MOD MDM: CPT

## 2021-03-19 RX ORDER — CYCLOBENZAPRINE HYDROCHLORIDE 10 MG/1
1 TABLET, FILM COATED ORAL
Qty: 9 | Refills: 0
Start: 2021-03-19 | End: 2021-03-21

## 2021-03-19 RX ORDER — IBUPROFEN 200 MG
1 TABLET ORAL
Qty: 12 | Refills: 0
Start: 2021-03-19 | End: 2021-03-21

## 2021-03-19 NOTE — ED STATDOCS - NSFOLLOWUPINSTRUCTIONS_ED_ALL_ED_FT
Ciática    LO QUE NECESITA SABER:    ¿Qué es la ciática?La ciática es thanh condición que causa dolor en el nervio ciático. El nervio ciático sale de la noe dorsal y corre por ambos lados de los glúteos. Luego baja por la parte posterior del muslo, la parte inferior de la pierna y el pie. El nervio ciático puede estar comprimido, inflamado, irritado o estirado en alguna parte y causar síntomas.    ¿Qué causa la ciática?La ciática puede estar relacionada con ciertas actividades, marisol postura y tensión física o psicológica. Cualquiera de los factores siguientes puede causar o incrementar thompson riesgo de tener ciática:   •Problema con los discos:La causa más común de la ciática es thanh hernia de disco (tejido acolchado que se encuentra entre los huesos de la columna). Es posible que el disco ponga presión en el nervio ciático. Puede que un hueso de la columna se deslice sobre otro, o que se haya estrechado el espacio en los huesos de la columna.      •Lesión muscular:[place] puede suceder después de torcerse o levantar un objeto pesado. La inflamación que resulta del esguince o irritación muscular en los glúteos, los muslos o las piernas pone presión en el nervio ciático.      •Obesidad o embarazo:El exceso de peso pone más presión en la espalda y las piernas.      •Trauma:Los golpes directos en los glúteos, los muslos o las piernas, los accidentes automovilísticos o las caídas pueden lesionar el nervio ciático.      •Enfermedades de la columna vertebral:La artritis, osteoporosis, cáncer o infección de la columna también pueden afectar el nervio ciático.      ¿Cuáles son los signos y síntomas de la ciática?La ciática puede tener síntomas a corto o a tamera plazo:  •Dolor que comienza en la parte inferior de la espalda y baja por los glúteos y la parte posterior del muslo      •Pérdida de la sensación u hormigueo en los glúteos y las piernas      •Debilidad muscular, dificultad para moverse o para controlar la pierna o el pie      •Dolor en las piernas que aumenta cuando está de pie, sentado o en cuclillas      ¿Cómo se diagnostica la ciática?Thompson médico le preguntará sobre supriya otras condiciones médicas. Puede que le pregunte acerca de thompson trabajo, thompson historial de dolor en la espalda y las enfermedades o cirugías que ha tenido. Lo examinará y le moverá las piernas para comprobar qué hace que el dolor aumente. Es posible que también necesite alguno de los siguientes tratamientos:  •Radiografía:Son imágenes de los huesos y los tejidos de thompson espalda, caderas, muslos o piernas. Es posible que esta prueba muestre otros problemas, duncan fracturas (huesos quebrados).      •Tomografía computarizada:Alaina examen también se conoce duncan escán TAC. Thanh máquina de fredy x utiliza thanh computadora para nyla imágenes de supriya caderas, muslos y piernas. Puede que las imágenes muestren thompson nervio ciático, músculos y vasos sanguíneos. Es posible que le administren un medio de contraste antes de nyla las imágenes para que los médicos las puedan karthikeyan con mayor claridad. Dígale al médico si usted alguna vez ha tenido thanh reacción alérgica al tinte de contraste.      •Imágenes por resonancia magnética (IRM):En alaina estudio se utilizan imanes potentes y thanh computadora para nyla imágenes de las caderas, muslos y piernas. Puede que la IRM muestre si se ha dañado los nervios, músculos, huesos y vasos sanguíneos. Le podrían administrar un tinte para ayudar a que las imágenes se vean mejor. Dígale al médico si usted alguna vez ha tenido thanh reacción alérgica al tinte de contraste. No entre a la dash donde se realiza la resonancia magnética con algo de metal. El metal puede causar lesiones serias. Dígale al médico si usted tiene algo de metal dentro de thompson cuerpo o por encima.      •Thanh electromiografía (EMG)es un examen que mide la actividad eléctrica de supriya músculos en descanso y en movimiento.      •Pruebas de conducción nerviosa:Estos estudios comprueban la manera en que los nervios superficiales y los músculos relacionados reaccionan a la estimulación. Se colocan electrodos con cables o agujas diminutas en ciertas áreas, duncan los glúteos y las piernas.      ¿Cuál es el tratamiento para la ciática?  •AINEs (analgésicos antiinflamatorios no esteroides):Estos medicamentos disminuyen la inflamación y el dolor. Los AINEs se pueden obtener sin receta médica. Consulte con thompson médico cuál medicamento es el adecuado para usted. Pregunte cuánto nyla y cuándo. Tómelos duncan se le indique. Cuando no se krysten de la manera indicada, los medicamentos antiinflamatorios no esteroides pueden causar sangrado estomacal o problemas renales.      •Acetaminofeno:Alaina medicamento disminuye el dolor. El acetaminofeno puede obtener sin receta médica. Pregunte la cantidad y la frecuencia con que debe tomarlos. Siga las indicaciones. El acetaminofén puede causar daño en el hígado cuando no se ko de forma correcta.      •Relajantes muscularesayudan a reducir dolor y espasmos musculares.      •Medicamento esteroide epidural:Puede incluir tanto un anestésico (medicamento para adormecer) duncan un esteroide, que puede bajar la inflamación y calmar el dolor. Se administra en forma de inyección cerca de la noe dorsal, en el área donde siente el dolor.      •Quimionucleólisis:Se administra thanh inyección en el disco afectado para ablandarlo o encogerlo.      •Cirugía:Es posible que se realice thanh cirugía para corregir problemas duncan un disco dañado o un tumor en la columna. Se puede realizar para disminuir la presión en el nervio ciático. Los médicos también pueden liberar los músculos que están presionando el nervio ciático.      ¿Cómo puedo ayudar a controlar la ciática?  •Terapia de ultrasonido:Thanh máquina emplea ondas sonoras para aliviar el dolor. Es posible que se use además un medicamento tópico para contribuir a calmar el dolor y la inflamación.      •Fisioterapia:Un fisioterapeuta le puede enseñar ejercicios para ayudarle a mejorar el movimiento y la fuerza, y para disminuir el dolor. Un terapeuta ocupacional le enseña habilidades para ayudarlo con supriya actividades diarias.      •Dispositivos de asistencia:Es posible que deba usar un soporte para la espalda, duncan thanh faja. Puede que necesite muletas, un bastón o un andador para disminuir la presión en los músculos de la parte inferior de la espalda y las piernas. Pregunte a thompson médico por más información sobre los dispositivos de asistencia y cómo se usan de forma correcta.      ¿Cómo se puede prevenir la ciática?  •Evite la presión en la espalda y las piernas:No  levante objetos pesados, ni esté de pie o sentado por períodos prolongados de tiempo.      •Levante los objetos de manera murillo:Mantenga la espalda derecha y doble las rodillas para alzar un objeto. No doble ni tuerza la espalda cuando levante algo.      •Mantenga un peso saludable:Consulte con thompson médico cuánto debería pesar. Pida que le ayude a crear un plan para bajar de peso si usted tiene sobrepeso.      •Ejercicio:Pídale a thompson médico que le recomiende el programa de estiramiento, calentamiento y ejercicio más apropiado para usted.      ¿Cuáles son los riesgos de la ciática?Si no se administra correctamente, la inyección epidural de esteroide puede resultar en trastornos de dolor o parálisis. También puede causar dolor de betzaida, dolor en la pierna y bloqueo del flujo sanguíneo a la médula crews. Puede sangrar o contraer thanh infección duncan resultado de la cirugía. Si no recibe tratamiento, supriya músculos y nervios se podrían dañar de forma permanente. Es posible que tenga menos fuerza. Es posible que no pueda  la pierna o controlar cuándo orina o evacua los intestinos.    ¿Cuándo norma comunicarme con mi médico?  •Le duele la parte inferior de la espalda por la noche o cuando descansa.      •Le duele la parte inferior de la espalda y se le adormece la pierna por debajo de la rodilla.      •Tiene debilidad en thanh pierna solamente.      •Usted tiene preguntas o inquietudes acerca de thompson condición o cuidado.      ¿Cuándo norma buscar atención inmediata o llamar al 911?  •Tiene dificultad para contener la orina o las evacuaciones intestinales.      •Tiene debilidad en ambas piernas.      •Pierde la sensación en la tejinder o los glúteos.      ACUERDOS SOBRE THOMPSON CUIDADO:    Usted tiene el derecho de ayudar a planear thompson cuidado. Aprenda todo lo que pueda sobre thompson condición y duncan darle tratamiento. Discuta supriya opciones de tratamiento con supriya médicos para decidir el cuidado que usted desea recibir. Usted siempre tiene el derecho de rechazar el tratamiento.      Disuria    LO QUE NECESITA SABER:    ¿Qué es disuria?La disuria es la dificultad al orinar, o dolor, ardor o molestias al orinar. La disuria por lo general es un síntoma de algún otro problema.    ¿Cuáles son las causas de la disuria?Las causas más comunes de la disuria son las siguientes:  •Infecciones, duncan infecciones urinarias e infecciones de transmisión sexual      •Trauma, duncan thanh lesión en bicicleta o abuso sexual      •Estructura anormal, duncan un estrechamiento de la uretra      •Bloqueo, duncan cálculos en los riñones      •Afecciones médicas, duncan estreñimiento, agrandamiento de la próstata y artritis reactiva      •Químicos, duncan duchas de higiene femenina, espermicidas, cyn de aceites      •Medicamentos, duncan la quimioterapia      ¿Qué aumenta mi riesgo de disuria?  •Deshidratación      •Pérdida de la fuerza muscular en la vejiga debido a la edad avanzada      •Contener la orina en thompson vejiga por largos periodos de tiempo      •Cafeína, soda, alcohol y bebidas cítricas      ¿Qué otros síntomas podría tener con la disuria?  •Fiebre o escalofríos      •Orina turbia y de mal olor      •Ganas de orinar con frecuencia, ingrid orinar muy poco      •Dolor en la espalda, en el costado o en el abdomen      •Shelbie en la orina      •Secreción maloliente      •Comezón      •Inflamación en el área genital      •Dolor al eyacular o al tener evacuaciones intestinales (en los varones)      ¿Cómo se diagnostica la disuria?Thompson médico lo examinará y le hará preguntas acerca de supriya síntomas. Infórmele a thompson médico sobre cualquier medicamento que esté tomando. Usted podría necesitar alguno de los siguientes para encontrar la causa de thompson disuria:   •Un examen de orinapodría realizarse para buscar bacteria, shelbie o pus.      •Los análisis de sangrepodría realizarse para buscar signos de infección.      •Thanh cistoscopiapermite a thompson médico buscar problemas en el interior de thompson vejiga. Un cistoscopio se introduce por thompson uretra hasta llegar a la vejiga. El cistoscopio es thanh sonda equipada con thanh ruthie y thanh cámara diminuta al extremo.      •Thanh ecografíausa ondas sonoras para mostrar imágenes en thanh pantalla. Se podría realizar un ultrasonido para mostrar los problemas en thompson vejiga.      ¿Cuál es el tratamiento para la disuria?El tratamiento dependerá en la causa de thompson disuria. Thompson médico podría referirlo a un especialista, duncan un urólogo o nefrólogo. Usted podría necesitar medicamentos para ayudar a tratar thanh infección bacteriana o ayudar a disminuir los espasmos de la vejiga.    ¿Cómo puedo controlar mi disuria?  •Ingiera más líquidos.Los líquidos ayudan a desechar las bacterias que estén provocando thanh infección. Pregunte a thompson médico sobre la cantidad de líquido que necesita nyla todos los tati y cuáles le recomienda.      •[place] cyn de asiento duncan se le indique.Llene thanh aldo de baño con 4 a 6 pulgadas de agua cálida. También puede usar un recipiente para baño de asiento que quepa en el inodoro. Siéntese en el baño de asiento toño 20 minutos. Maia esto 2 a 3 veces al día o duncan se lo hayan indicado. El agua cálida va a ayudar a reducir el dolor y la inflamación.       ¿Cuándo norma buscar atención inmediata?  •Usted tiene dolor intenso en la espalda, en un costado o en el abdomen.      •Usted tiene fiebre y escalofríos con temblor.      •Usted vomita varias veces seguidas.      ¿Cuándo norma comunicarme con mi médico?  •Supriya síntomas no desaparecen, aún después del tratamiento.      •Usted tiene preguntas o inquietudes acerca de thompson condición o cuidado.      ACUERDOS SOBRE THOMPSON CUIDADO:    Usted tiene el derecho de ayudar a planear thompson cuidado. Aprenda todo lo que pueda sobre thompson condición y duncan darle tratamiento. Discuta supriya opciones de tratamiento con supriya médicos para decidir el cuidado que usted desea recibir. Usted siempre tiene el derecho de rechazar el tratamiento.

## 2021-03-19 NOTE — ED STATDOCS - CLINICAL SUMMARY MEDICAL DECISION MAKING FREE TEXT BOX
36 y/o male presents with right testicular pain, dysuria, and also lower back discomfort that goes to knee. Will r/o torsion, STI, UTI. However, suspect pain is related to sciatica. No CVA tenderness and no signs of stone. 34 y/o male presents with right testicular pain, dysuria, and also lower back discomfort that goes to knee. Will r/o torsion, STI, UTI. However, suspect pain is related to sciatica. No CVA tenderness and no signs of stone.      Urinalysis demonstrating no UTI.  ?sciatica pain in testicle ?sciatica pain.  Pt. to follow with ortho spine and urology.  Awaiting urine culture.  Charlene Antunez PA-C

## 2021-03-19 NOTE — ED ADULT NURSE NOTE - NSIMPLEMENTINTERV_GEN_ALL_ED
Implemented All Universal Safety Interventions:  [place] to call system. Call bell, personal items and telephone within reach. Instruct patient to call for assistance. Room bathroom lighting operational. Non-slip footwear when patient is off stretcher. Physically safe environment: no spills, clutter or unnecessary equipment. Stretcher in lowest position, wheels locked, appropriate side rails in place.

## 2021-03-19 NOTE — ED STATDOCS - NS ED ROS FT
Constitutional: No fever or chills  Eyes: No visual changes  HEENT: No throat pain  CV: No chest pain  Resp: No SOB no cough  GI: No abd pain, nausea or vomiting  : +dysuria, testicular pain   MSK: +back pain and knee pain   Skin: No rash  Neuro: No headache

## 2021-03-19 NOTE — ED STATDOCS - OBJECTIVE STATEMENT
34 y/o male with PMHx of  HLD, urethritis, UTI, renal vascular disease, s/p unilateral orchiectomy  c/o testicular pain. Pt reports lower back and knee discomfort, and dysuria. Pt states he has hx of frequent UTI and is on No fevers, discharge from penis. Pt is sexually active. 34 y/o male with PMHx of  HLD, urethritis, UTI, renal vascular disease, s/p unilateral orchiectomy  c/o testicular pain. Pt reports lower back and knee discomfort, and dysuria. Pt states he has hx of frequent UTI and is on No fevers, discharge from penis. Pt is sexually active.   #: [id number] 34 y/o male with PMHx of  HLD, urethritis, UTI, renal vascular disease, s/p unilateral orchiectomy  c/o testicular pain. Pt reports lower back and knee discomfort, and dysuria. Pt states he has hx of frequent UTI and is on abx. No fevers, discharge from penis. Pt is sexually active.   #: [phone]

## 2021-03-19 NOTE — ED STATDOCS - PROVIDER TOKENS
PROVIDER:[TOKEN:[[id number]]] PROVIDER:[TOKEN:[[id number]]],PROVIDER:[TOKEN:[[id number]]]

## 2021-03-19 NOTE — ED ADULT TRIAGE NOTE - CHIEF COMPLAINT QUOTE
Pt presents to ED c/o testicular pain x 1week. Pt states that now pain radiates down right leg, and left flank pain. Denies chest pain, SOB, difficulty voiding.

## 2021-03-19 NOTE — ED STATDOCS - PATIENT PORTAL LINK FT
You can access the FollowMyHealth Patient Portal offered by [facility] by registering at the following website: [email or web address]. By joining [employer]’s FollowMyHealth portal, you will also be able to view your health information using other applications (apps) compatible with our system.

## 2021-03-19 NOTE — ED STATDOCS - CARE PROVIDER_API CALL
Des Gould; PhD)  Neurosurgery  [address] [facility], [address]nd [place] [address]  Phone: [phone]  Fax: [phone]  Follow Up Time:    Des Gould; PhD)  Neurosurgery  [address] [facility], [address]nd [place] [address]  Phone: [phone]  Fax: [phone]  Follow Up Time:     Rohith Slaughter)  Urology  [address] [place] [address]  Phone: [phone]  Fax: [phone]  Follow Up Time:

## 2021-03-19 NOTE — ED STATDOCS - NS_ ATTENDINGSCRIBEDETAILS _ED_A_ED_FT
I, Sabino Jordan MD,  performed the initial face to face bedside interview with this patient regarding history of present illness, review of symptoms and relevant past medical, social and family history.  I completed an independent physical examination.  I was the initial provider who evaluated this patient.  The history, relevant review of systems, past medical and surgical history, medical decision making, and physical examination was documented by the scribe in my presence and I attest to the accuracy of the documentation.

## 2021-03-19 NOTE — ED ADULT NURSE NOTE - OBJECTIVE STATEMENT
ros  constitutional: negative - no fever, no acute distress, well nourished  eyes: negative - No discharge, No redness  enmt: negative - no nasal congestion  cardio: negative - no chest pain  respiratory: negative - no cough  gi: negative - no vomiting, no diarrhea  gu: negative - + dysuria   msk : negative - no pain, no limited range of motion  integumentary: negative -  no rash  neuro: negative - no change in level of consciousness, no change in sensation  psych: negative - not suicidal, no depression  endocrine: negative - no polyuria, no polydipsia    physical exam  cardiovascular - +s1/s2, regular rate and rhythm, no jvd  respiratory - lungs CTA b/l, normal respiration rate, oxygen saturation, no respiratory distress. chest rise and fall symmetrical   gastrointestinal - +bs in all 4 quadrants, no abdominal distension.   genitourinary - + dysuria   musculoskeletal - full ROM  integumentary - normal for race

## 2021-03-19 NOTE — ED STATDOCS - PROGRESS NOTE DETAILS
Pt. is a 35 year old male Hx Hypospadies, orchidectomy, urinary stricture preseunts with dysuria x 3 days.  Pt. with history of recurrent UTI, six treatments with ABX in the last year.  Pt. ahd ureteral dilation surgeyr with Dr. Slaughter one week ago.  Now with testicular pain x 2 days and Dysuria.  Neg. F/C/S.  Abdominal plain or hematuria.   Plan for UA, culture, GC/chalmydia, US testicle.   Charlene Antunez PA-C Urinalysis demonstrating no UTI.  ?sciatica pain in testicle ?sciatica pain.  Pt. to follow with ortho spine and urology.  Awaiting urine culture.  Charlene Antunez PA-C Urinalysis demonstrating no UTI.  ?sciatica pain in testicle ?sciatica pain.  Pt. to follow with ortho spine and urology.  Awaiting urine culture.  Flexeril and Motrin at home.  Sticker left in book.   Charlene Antunez PA-C Urinalysis demonstrating no UTI.  ?sciatica pain in testicle ?sciatica pain.  Pt. to follow with ortho spine and urology.  Awaiting urine culture.  Flexeril and Motrin at home.  Sticker left in book.   Interpeter # [id number] used  Charlene Antunez PA-C

## 2021-03-19 NOTE — ED STATDOCS - CARE PROVIDERS DIRECT ADDRESSES
,[email or web address],[email or web address]_Unknown

## 2021-03-19 NOTE — ED STATDOCS - PHYSICAL EXAMINATION
Constitutional: NAD AAOx3  Eyes: PERRLA EOMI  Head: Normocephalic atraumatic  Mouth: MMM  Cardiac: regular rate   Resp: Lungs CTAB  GI: Abd s/nt/nd, no CVA tenderness   : normal testicular line, normal cremasteric reflux, no redness and swelling, positive SLR right   Neuro: CN2-12 intact  Skin: No rashes Constitutional: NAD AAOx3  Eyes: PERRLA EOMI  Head: Normocephalic atraumatic  Mouth: MMM  Cardiac: regular rate   Resp: Lungs CTAB  GI: Abd s/nt/nd, no CVA tenderness   : normal testicular lie, normal cremasteric reflux, no redness and swelling,   msk: positive SLR right   Neuro: CN2-12 intact  Skin: No rashes

## 2021-03-20 PROBLEM — E78.5 HYPERLIPIDEMIA, UNSPECIFIED: Chronic | Status: ACTIVE | Noted: 2021-03-11

## 2021-03-20 LAB
C TRACH RRNA SPEC QL NAA+PROBE: SIGNIFICANT CHANGE UP
N GONORRHOEA RRNA SPEC QL NAA+PROBE: SIGNIFICANT CHANGE UP
SPECIMEN SOURCE: SIGNIFICANT CHANGE UP

## 2021-03-23 RX ORDER — CEPHALEXIN 500 MG
1 CAPSULE ORAL
Qty: 21 | Refills: 0
Start: 2021-03-23 | End: 2021-03-29

## 2021-03-23 NOTE — ED POST DISCHARGE NOTE - RESULT SUMMARY
+ urine culture. I attempted to call patient and he hung up on me.  I called with [facility]  # [id number].  I sent an Rx for [facility].  Patient will follow with urologist.  Harmony LORENZANA  I

## 2021-04-02 ENCOUNTER — RESULT REVIEW (OUTPATIENT)
Age: 36
End: 2021-04-02

## 2021-04-02 ENCOUNTER — APPOINTMENT (OUTPATIENT)
Dept: RADIOLOGY | Facility: CLINIC | Age: 36
End: 2021-04-02
Payer: MEDICAID

## 2021-04-02 ENCOUNTER — OUTPATIENT (OUTPATIENT)
Dept: OUTPATIENT SERVICES | Facility: HOSPITAL | Age: 36
LOS: 1 days | End: 2021-04-02
Payer: MEDICAID

## 2021-04-02 ENCOUNTER — APPOINTMENT (OUTPATIENT)
Dept: NEUROSURGERY | Facility: CLINIC | Age: 36
End: 2021-04-02
Payer: MEDICAID

## 2021-04-02 VITALS
DIASTOLIC BLOOD PRESSURE: 77 MMHG | BODY MASS INDEX: 27.49 KG/M2 | TEMPERATURE: 98 F | OXYGEN SATURATION: 100 % | HEIGHT: 65 IN | WEIGHT: 165 LBS | HEART RATE: 66 BPM | SYSTOLIC BLOOD PRESSURE: 117 MMHG

## 2021-04-02 DIAGNOSIS — F17.200 NICOTINE DEPENDENCE, UNSPECIFIED, UNCOMPLICATED: ICD-10-CM

## 2021-04-02 DIAGNOSIS — Z78.9 OTHER SPECIFIED HEALTH STATUS: ICD-10-CM

## 2021-04-02 DIAGNOSIS — R10.31 RIGHT LOWER QUADRANT PAIN: ICD-10-CM

## 2021-04-02 DIAGNOSIS — Z82.49 FAMILY HISTORY OF ISCHEMIC HEART DISEASE AND OTHER DISEASES OF THE CIRCULATORY SYSTEM: ICD-10-CM

## 2021-04-02 DIAGNOSIS — Z90.79 ACQUIRED ABSENCE OF OTHER GENITAL ORGAN(S): Chronic | ICD-10-CM

## 2021-04-02 DIAGNOSIS — M43.06 SPONDYLOLYSIS, LUMBAR REGION: ICD-10-CM

## 2021-04-02 DIAGNOSIS — Z83.49 FAMILY HISTORY OF OTHER ENDOCRINE, NUTRITIONAL AND METABOLIC DISEASES: ICD-10-CM

## 2021-04-02 DIAGNOSIS — M54.5 LOW BACK PAIN: ICD-10-CM

## 2021-04-02 DIAGNOSIS — M25.561 PAIN IN RIGHT KNEE: ICD-10-CM

## 2021-04-02 PROCEDURE — 99203 OFFICE O/P NEW LOW 30 MIN: CPT

## 2021-04-02 PROCEDURE — 99072 ADDL SUPL MATRL&STAF TM PHE: CPT

## 2021-04-02 PROCEDURE — 72110 X-RAY EXAM L-2 SPINE 4/>VWS: CPT | Mod: 26

## 2021-04-02 PROCEDURE — 73564 X-RAY EXAM KNEE 4 OR MORE: CPT | Mod: 26,RT

## 2021-04-02 PROCEDURE — 73564 X-RAY EXAM KNEE 4 OR MORE: CPT

## 2021-04-02 PROCEDURE — 72110 X-RAY EXAM L-2 SPINE 4/>VWS: CPT

## 2021-04-07 PROBLEM — Z78.9 NO KNOWN HEALTH PROBLEMS: Status: RESOLVED | Noted: 2021-04-02 | Resolved: 2021-04-07

## 2021-04-07 PROBLEM — Z83.49 FAMILY HISTORY OF HYPERGLYCEMIA: Status: ACTIVE | Noted: 2021-04-02

## 2021-04-07 PROBLEM — Z82.49 FAMILY HISTORY OF HYPERTENSION: Status: ACTIVE | Noted: 2021-04-02

## 2021-04-07 PROBLEM — Z78.9 DOES NOT USE ILLICIT DRUGS: Status: ACTIVE | Noted: 2021-04-02

## 2021-04-07 PROBLEM — F17.200 CURRENT EVERY DAY SMOKER: Status: ACTIVE | Noted: 2021-04-02

## 2021-04-07 PROBLEM — Z78.9 SOCIAL ALCOHOL USE: Status: ACTIVE | Noted: 2021-04-02

## 2021-04-07 NOTE — CONSULT LETTER
[Dear  ___] : Dear  [unfilled], [Courtesy Letter:] : I had the pleasure of seeing your patient, [unfilled], in my office today. [Sincerely,] : Sincerely, [FreeTextEntry1] : Sincere Madrigal is a 35-year-old male who presents today with lumbar symptoms.  Patient states symptoms started approximately 3 years ago after lifting a heavy bag of cement.  Patient states he gets frequent flareups.  However 1 month ago he had developed a flareup after playing soccer.  He reports 6 out of 10 constant pressure and tight lower back pain with radiation into his right groin.  He also reports right knee pain.  Patient reports numbness and tingling only to his right knee.  He denies any leg burning or shooting pains.  He reports right leg weakness with driving.  He denies any difficulty walking or tripping over his feet.  He denies bowel or bladder dysfunction or saddle anesthesia.  Patient reports mechanical movement such as  bending or sitting to standing worsens symptoms.  \par \par He reports Tylenol and Motrin do not provide him with any relief.  Patient was evaluated at [facility] on 3/19/2021 with testicular pain x2 days and lower back pain.  He recently underwent unilateral orchiectomy.  Patient was therefore referred to our services as well as urology.  Patient was prescribed Flexeril.  He was noted to have positive urine culture treated Keflex.\par \par There is no recent imaging to review with the patient.  However patient had underwent CT of the abdomen and pelvis on 10/3/2019 which reveals L5 on S1 pars fracture.\par \par Patient is alert and oriented.  No distress noted.  Negative clonus.  Negative Yamileth's.  Strength to bilateral lower extremities 5/5.  Left Achilles tendon reflex absent.  Right Achilles tendon reflex present.  Bilateral patellar reflexes present and equal.  Sensation to light touch to lower extremities equal and normal.\par \par I provided the patient with a prescription for extension and flexion x-rays of the lumbar spine and MRI of the lumbar spine.  I also provided the patient with a prescription for x-ray of the right knee.  We will follow up in office once imaging is completed.  Patient is aware to call with any further questions or concerns or with any new or worsening symptoms. [FreeTextEntry3] : Angélica Bryson, MSN, FNP-BC\par Nurse Practitioner\par Neurosurgery\par [address] [place] [address], [address]nd floor \[place] [address] \par Office: [phone] \par Fax: [phone]\par \par

## 2021-04-07 NOTE — REVIEW OF SYSTEMS
[Feeling Tired] : feeling tired [Eye Pain] : eye pain [Negative] : Heme/Lymph [FreeTextEntry4] : Ear Pain

## 2021-04-14 NOTE — ED ADULT NURSE NOTE - PAIN RATING/NUMBER SCALE (0-10): REST
What is lung cancer screening? Lung cancer screening is a way in which doctors check the lungs for early signs of cancer in people who have no symptoms of lung cancer. A low-dose CT scan uses much less radiation than a normal CT scan and shows a more detailed image of the lungs than a standard X-ray. The goal of lung cancer screening is to find cancer early, before it has a chance to grow, spread, or cause problems. One large study found that smokers who were screened with low-dose CT scans were less likely to die of lung cancer than those who were screened with standard X-ray. Below is a summary of the things you need to know regarding screening for lung cancer with low-dose computed tomography (LDCT). This is a screening program that involves routine annual screening with LDCT studies of the lung. The LDCTs are done using low-dose radiation that is not thought to increase your cancer risk. If you have other serious medical conditions (other cancers, congestive heart failure) that limit your life expectancy to less than 10 years, you should not undergo lung cancer screening with LDCT. The chance is 20%-60% that the LDCT result will show abnormalities. This would require additional testing which could include repeat imaging or even invasive procedures. Most (about 95%) of \"abnormal\" LDCT results are false in the sense that no lung cancer is ultimately found. Additionally, some (about 10%) of the cancers found would not affect your life expectancy, even if undetected and untreated. If you are still smoking, the single most important thing that you can do to reduce your risk of dying of lung cancer is to quit. For this screening to be covered by Medicare and most other insurers, strict criteria must be met. If you do not meet these criteria, but still wish to undergo LDCT testing, you will be required to sign a waiver indicating your willingness to pay for the scan. 0

## 2021-05-18 ENCOUNTER — APPOINTMENT (OUTPATIENT)
Dept: NEUROSURGERY | Facility: CLINIC | Age: 36
End: 2021-05-18
Payer: MEDICAID

## 2021-05-18 VITALS
WEIGHT: 160 LBS | DIASTOLIC BLOOD PRESSURE: 74 MMHG | OXYGEN SATURATION: 99 % | HEART RATE: 66 BPM | TEMPERATURE: 98.1 F | HEIGHT: 65 IN | SYSTOLIC BLOOD PRESSURE: 129 MMHG | BODY MASS INDEX: 26.66 KG/M2

## 2021-05-18 DIAGNOSIS — M25.561 PAIN IN RIGHT KNEE: ICD-10-CM

## 2021-05-18 PROCEDURE — 99215 OFFICE O/P EST HI 40 MIN: CPT

## 2021-05-20 PROBLEM — M25.561 RIGHT KNEE PAIN: Status: ACTIVE | Noted: 2021-04-02

## 2021-05-20 NOTE — CONSULT LETTER
[Dear  ___] : Dear  [unfilled], [Courtesy Letter:] : I had the pleasure of seeing your patient, [unfilled], in my office today. [Sincerely,] : Sincerely, [FreeTextEntry1] : Mr. Lemus is a very pleasant 35-year-old male patient who was seen in our office today in follow-up to review his imaging findings.  The patient was scheduled for an MRI scan of the lumbar spine but did not complete this scan prior to this visit.  The patient is primarily Malawian-speaking and translation was provided through a  service.\par \par Briefly, the patient was previously assessed by our nurse practitioner who ordered several images of the patient's lumbar spine.  The patient states that his low back pain is intermittent and started approximately 3 years ago after lifting a heavy bag of cement.  The patient states that he  frequently has exacerbations of his pain.  1 month ago.  The patient experienced a severe exacerbation of his low back pain with radiation into the right groin knee while playing soccer.  The patient reports that pain was a 6/10 in severity.  The patient denied any other neurologic symptoms such as numbness or tingling or radiation into the lower extremities.  The patient's pain is primarily mechanical in the lower back.  At today's visit, the patient's pain is significantly improved and the patient states that his pain in the back is now a 3-4/10 in severity.  The patient states that his right knee symptoms have all but resolved.\par \par On examination, the patient is alert, oriented, and compliant with the exam.  The patient demonstrates 5/5 strength in the upper and lower extremities bilaterally.  The patient has full range of motion of the lumbar spine.  The patient has full range of motion of the knee without pain.  The patient ambulates well.\par \par The patient is accompanied with flexion/extension x-rays of the lumbar spine performed on April 2, 2021 which again demonstrates the patient's L5 bilateral pars defect.  There is no gross instability on these images.  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  However, given that the patient has not attempted any conservative therapy and the patient's symptoms are improving, I have recommended only conservative therapy at this time.  Specifically, I recommended physical therapy for core strengthening exercises of the lumbar spine as well as range of motion exercises.  I have also provided the patient a brace for protection of his back while he is at work and required to perform heavy lifting duties.  I counseled the patient that he should not be wearing this brace constantly as it can cause muscle disuse over time.  I have recommended follow-up with us in approximately 6 weeks to reevaluate his progress with conservative therapy but also to review his MRI scan which he will obtain shortly.  The patient is aware that he may contact our office at anytime sooner should the need arise. [FreeTextEntry3] : Richie Peterson MD, PhD, FRCPSC \par Attending Neurosurgeon \par [facility] \par [address], [address]nd floor \[place] [address] \par Office: [phone] \par Fax: [phone]\par \par

## 2021-05-24 ENCOUNTER — OUTPATIENT (OUTPATIENT)
Dept: OUTPATIENT SERVICES | Facility: HOSPITAL | Age: 36
LOS: 1 days | End: 2021-05-24
Payer: MEDICAID

## 2021-05-24 ENCOUNTER — APPOINTMENT (OUTPATIENT)
Dept: MRI IMAGING | Facility: CLINIC | Age: 36
End: 2021-05-24
Payer: MEDICAID

## 2021-05-24 DIAGNOSIS — M43.06 SPONDYLOLYSIS, LUMBAR REGION: ICD-10-CM

## 2021-05-24 DIAGNOSIS — Z00.8 ENCOUNTER FOR OTHER GENERAL EXAMINATION: ICD-10-CM

## 2021-05-24 DIAGNOSIS — M54.5 LOW BACK PAIN: ICD-10-CM

## 2021-05-24 DIAGNOSIS — Z90.79 ACQUIRED ABSENCE OF OTHER GENITAL ORGAN(S): Chronic | ICD-10-CM

## 2021-05-24 PROCEDURE — 72148 MRI LUMBAR SPINE W/O DYE: CPT

## 2021-05-24 PROCEDURE — 72148 MRI LUMBAR SPINE W/O DYE: CPT | Mod: 26

## 2021-06-29 ENCOUNTER — APPOINTMENT (OUTPATIENT)
Dept: NEUROSURGERY | Facility: CLINIC | Age: 36
End: 2021-06-29
Payer: MEDICAID

## 2021-06-29 VITALS
OXYGEN SATURATION: 99 % | TEMPERATURE: 98 F | WEIGHT: 160 LBS | HEART RATE: 67 BPM | HEIGHT: 65 IN | BODY MASS INDEX: 26.66 KG/M2 | DIASTOLIC BLOOD PRESSURE: 85 MMHG | SYSTOLIC BLOOD PRESSURE: 137 MMHG

## 2021-06-29 DIAGNOSIS — Z87.39 PERSONAL HISTORY OF OTHER DISEASES OF THE MUSCULOSKELETAL SYSTEM AND CONNECTIVE TISSUE: ICD-10-CM

## 2021-06-29 DIAGNOSIS — M43.06 SPONDYLOLYSIS, LUMBAR REGION: ICD-10-CM

## 2021-06-29 PROCEDURE — 99212 OFFICE O/P EST SF 10 MIN: CPT

## 2021-07-01 PROBLEM — M43.06 PARS DEFECT OF LUMBAR SPINE: Status: ACTIVE | Noted: 2021-04-02

## 2021-07-01 PROBLEM — Z87.39 HISTORY OF LOW BACK PAIN: Status: RESOLVED | Noted: 2021-04-02 | Resolved: 2021-07-01

## 2021-07-01 PROBLEM — M43.06 LUMBAR SPONDYLOLYSIS: Status: ACTIVE | Noted: 2021-04-02

## 2021-07-01 NOTE — CONSULT LETTER
[Dear  ___] : Dear  [unfilled], [Courtesy Letter:] : I had the pleasure of seeing your patient, [unfilled], in my office today. [Sincerely,] : Sincerely, [FreeTextEntry1] : Mr. Lemus is a very pleasant 35-year-old male patient who was seen in our office in follow-up to review images.  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  The patient was subsequently diagnosed with a bilateral pars defect at L5 and an MRI scan was organized for further evaluation.  I am happy to report that, at this time, the patient is currently pain-free. \par \par On examination, the patient is alert, oriented, and compliant with the exam.  The patient demonstrates 5/5 strength in the lower extremities bilaterally with 2+ reflexes in the lower extremities bilaterally.  There is no evidence of hyperreflexia or pathologic reflexes.\par \par The patient is accompanied with an MRI scan of the lumbar spine dated May 24, 2021.  These images demonstrate again the patient's bilateral pars defects at L5.  Additionally, these images demonstrate areas of additional stress caused by the defect.  Specifically, there are very mild endplate changes at L5/S1, disc desiccation at L5/S1, and soft tissue edema between the L4 and L5 spinous processes.  There is no evidence of cord, nerve root, or cauda equina compression.\par \par Taken together, the patient has a clinical history and radiographic findings most consistent with a bilateral pars defect at L5 which is currently asymptomatic.  Given that the patient is currently doing well, I have recommended follow-up with us on an as-needed basis.  The patient acknowledged understanding of his clinical situation and will contact us in the future should the need arise.   [FreeTextEntry3] : Richie Peterson MD, PhD, FRCPSC \par Attending Neurosurgeon \par [facility] \par [address], [address]nd floor \[place] [address] \par Office: [phone] \par Fax: [phone]\par \par

## 2022-01-05 NOTE — ASU PATIENT PROFILE, ADULT - BRADEN SCORE (IF 18 OR LESS ACTIVATE SKIN INJURY RISK INCREASED GUIDELINE), MLM
Plan:    The patient is status post cholecystectomy for acute cholecystitis and alcohol lithiasis.  Patient underwent ERCP intraoperatively.  The patient is noted to have slightly elevated white count on discharge and elevated transaminases which are not expected based on his diagnosis.  Patient states he is feeling much better he no longer has the significant right upper quadrant abdominal pain.  The patient has no evidence of jaundice at this time.  Patient appears clinically improved.    I do recommend follow-up on his liver enzymes in approximately 3 to 4 weeks or per the discretion of his surgeon.  The patient has an appointment with his surgeon approximately a week and a half.    The patient has some mild diarrhea this may be secondary to recent antibiotics or secondary to the fact that has had a cholecystectomy.  I recommend that he start probiotic 1 capsule or tablet twice daily for up to 2 weeks post antibiotics.  If no improvement diarrhea consider cholestyramine.    Further recommendations pending on his response.  Please see me back in 1 month     All questions have been answered.    Flu vaccine once over abdominal tenderness.       
22

## 2022-06-06 ENCOUNTER — EMERGENCY (EMERGENCY)
Facility: HOSPITAL | Age: 37
LOS: 0 days | Discharge: HOME | End: 2022-06-06
Attending: HOSPITALIST
Payer: MEDICAID

## 2022-06-06 VITALS
HEART RATE: 68 BPM | TEMPERATURE: 99 F | HEIGHT: 66 IN | RESPIRATION RATE: 18 BRPM | DIASTOLIC BLOOD PRESSURE: 82 MMHG | OXYGEN SATURATION: 98 % | SYSTOLIC BLOOD PRESSURE: 132 MMHG | WEIGHT: 160.06 LBS

## 2022-06-06 DIAGNOSIS — E78.5 HYPERLIPIDEMIA, UNSPECIFIED: ICD-10-CM

## 2022-06-06 DIAGNOSIS — R21 RASH AND OTHER NONSPECIFIC SKIN ERUPTION: ICD-10-CM

## 2022-06-06 DIAGNOSIS — B02.9 ZOSTER WITHOUT COMPLICATIONS: ICD-10-CM

## 2022-06-06 DIAGNOSIS — Z87.440 PERSONAL HISTORY OF URINARY (TRACT) INFECTIONS: ICD-10-CM

## 2022-06-06 DIAGNOSIS — Z90.79 ACQUIRED ABSENCE OF OTHER GENITAL ORGAN(S): ICD-10-CM

## 2022-06-06 DIAGNOSIS — Z90.79 ACQUIRED ABSENCE OF OTHER GENITAL ORGAN(S): Chronic | ICD-10-CM

## 2022-06-06 PROCEDURE — 99283 EMERGENCY DEPT VISIT LOW MDM: CPT

## 2022-06-06 RX ORDER — VALACYCLOVIR 500 MG/1
1000 TABLET, FILM COATED ORAL ONCE
Refills: 0 | Status: COMPLETED | OUTPATIENT
Start: 2022-06-06 | End: 2022-06-06

## 2022-06-06 RX ORDER — IBUPROFEN 200 MG
600 TABLET ORAL ONCE
Refills: 0 | Status: COMPLETED | OUTPATIENT
Start: 2022-06-06 | End: 2022-06-06

## 2022-06-06 RX ORDER — VALACYCLOVIR 500 MG/1
1 TABLET, FILM COATED ORAL
Qty: 21 | Refills: 0
Start: 2022-06-06 | End: 2022-06-12

## 2022-06-06 RX ADMIN — VALACYCLOVIR 1000 MILLIGRAM(S): 500 TABLET, FILM COATED ORAL at 23:11

## 2022-06-06 RX ADMIN — Medication 600 MILLIGRAM(S): at 23:11

## 2022-06-06 NOTE — ED STATDOCS - OBJECTIVE STATEMENT
36M with rash to his chest and back for the past 2 days. rash is raised and burning, itchy. no similar sx in the past.

## 2022-06-06 NOTE — ED STATDOCS - NSFOLLOWUPINSTRUCTIONS_ED_ALL_ED_FT
Take tylenol as needed for pain, 650Mg every 6-8 hours.  You can also take ibuprofen as needed for pain, 600mg every 6-8 hours, take with food.  please take valacyclovir as prescribed  please keep area covered until it heals., it is contagious.

## 2022-06-06 NOTE — ED STATDOCS - PATIENT PORTAL LINK FT
You can access the FollowMyHealth Patient Portal offered by [facility] by registering at the following website: [email or web address]. By joining [facility]’s FollowMyHealth portal, you will also be able to view your health information using other applications (apps) compatible with our system.

## 2022-06-06 NOTE — ED STATDOCS - SKIN, MLM
skin normal color for race, warm, dry and intact. + shingles rash to left chest wall and left thoracic back.

## 2022-06-06 NOTE — ED ADULT TRIAGE NOTE - CHIEF COMPLAINT QUOTE
pt c/o multiple questionable insect bite/rash on chest and back since yesterday. pt  as living. C/o itchiness. Denies  other complaints.

## 2022-06-06 NOTE — ED STATDOCS - NSICDXPASTMEDICALHX_GEN_ALL_CORE_FT
PAST MEDICAL HISTORY:  Hyperlipidemia     Renal vascular disease     Urethritis     UTI (urinary tract infection)

## 2022-06-06 NOTE — ED STATDOCS - CLINICAL SUMMARY MEDICAL DECISION MAKING FREE TEXT BOX
36M with shingles, confined to 1 dermatome, x2 days,. will prescribe valacyclovir. discussed contact precautions.

## 2022-06-23 NOTE — ED STATDOCS - NSFOLLOWUPINSTRUCTIONS_ED_ALL_ED_FT
[address] [facility]  Progress Note Treva Liner 1956, 63 y o  female MRN: [id number]  Unit/Bed#: -01 Encounter: [id number]  Primary Care Provider: Joel Stevenson MD   Date and time admitted to hospital: 2022  6:39 AM    * Elevated BP without diagnosis of hypertension  Assessment & Plan  Likely reactive due to post op state, pain  Pt w/o prior hx of HTN and not on chronic antihtn meds  BP now well controlled  Advised at home bp monitoring at home and to keep a log of her readings  She is to contact her PCP if she has sbp > 160  No need to d/c on antihtn at this time  Stable from medical standpoint for discharge    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)  Assessment & Plan  · Hx of invasive ductal carcinoma ERPR positive HER2 negative s/p lumpectomy, left axillary dissection level I and level II lymph nodes pod 1  · Care per surg oncology      VTE Pharmacologic Prophylaxis:   Pharmacologic: Per primary team  Mechanical VTE Prophylaxis in Place: Yes    Patient Centered Rounds: I have performed bedside rounds with nursing staff today  Discussions with Specialists or Other Care Team Provider: Surg    Education and Discussions with Family / Patient: Patient    Time Spent for Care: 30 minutes  More than 50% of total time spent on counseling and coordination of care as described above  Current Length of Stay: 1 day(s)    Current Patient Status: Inpatient   Certification Statement: The patient will continue to require additional inpatient hospital stay due to Plan per primary team    Discharge Plan: Stable from medical standpoint for discharge    Code Status: Level 1 - Full Code      Subjective:   Pt seen and examined at bedside  BP much improved  Denies any cardiac symptoms  Denies any prior hx of htn or having been on antihtn meds    No acute events overnight    Objective:     Vitals:   Temp (24hrs), Av 2 °F (36 8 °C), Min:98 1 °F (36 7 °C), Max:98 4 °F (36 9 °C)    Temp:  [98 1 °F (36 7 °C)-98 4 °F (36 9 °C)] 98 1 °F (36 7 °C)  HR:  [70-81] 72  Resp:  [18] 18  BP: (138-171)/(78-94) 140/80  SpO2:  [96 %-97 %] 97 %  Body mass index is 21 89 kg/m²  Input and Output Summary (last 24 hours): Intake/Output Summary (Last 24 hours) at 6/23/2022 1011  Last data filed at 6/23/2022 0701  Gross per 24 hour   Intake 480 ml   Output 0 ml   Net 480 ml       Physical Exam:     Physical Exam  Cardiovascular:      Rate and Rhythm: Normal rate and regular rhythm  Pulses: Normal pulses  Heart sounds: Normal heart sounds  No murmur heard  Pulmonary:      Effort: Pulmonary effort is normal  No respiratory distress  Breath sounds: Normal breath sounds  No wheezing or rales  Abdominal:      General: Abdomen is flat  Bowel sounds are normal  There is no distension  Palpations: Abdomen is soft  Tenderness: There is no abdominal tenderness  There is no guarding  Musculoskeletal:         General: Normal range of motion  Cervical back: Normal range of motion and neck supple  Right lower leg: No edema  Left lower leg: No edema  Skin:     General: Skin is warm and dry  Comments: Drsg in place to L breast   Neurological:      General: No focal deficit present  Mental Status: She is alert  Mental status is at baseline  Cranial Nerves: No cranial nerve deficit  Motor: No weakness  Gait: Gait normal        Additional Data:     Labs:                                  * I Have Reviewed All Lab Data Listed Above    * Additional Pertinent Lab Tests Reviewed: No New Labs Available For Today    Imaging:    Imaging Reports Reviewed Today Include:   Imaging Personally Reviewed by Myself Includes:      Recent Cultures (last 7 days):           Last 24 Hours Medication List:   Current Facility-Administered Medications   Medication Dose Route Frequency Provider Last Rate    acetaminophen  975 mg Oral Q8H [address]  Gabby Childs BRIAN      diphenhydrAMINE  25 mg Intravenous Q6H PRN Mercy Garcias PA-C      docusate sodium  100 mg Oral BID Ankur Perez PA-C      dronabinol  15 mg Oral 4x Daily PRN Brandon Garcias PA-C      hydrALAZINE  10 mg Intravenous Q6H PRN Armani Prudent, RAMIREZ      ondansetron  4 mg Intravenous Q6H PRN Ankur Perez PA-C      traMADol  50 mg Oral Q6H PRN Carmen Yu PA-C          Today, Patient Was Seen By: Jeffery Goode DO    ** Please Note: Dictation voice to text software may have been used in the creation of this document   ** Urinary Tract Infection, Adult  ImageA urinary tract infection (UTI) is an infection of any part of the urinary tract, which includes the kidneys, ureters, bladder, and urethra. These organs make, store, and get rid of urine in the body. UTI can be a bladder infection (cystitis) or kidney infection (pyelonephritis).    What are the causes?  This infection may be caused by fungi, viruses, or bacteria. Bacteria are the most common cause of UTIs. This condition can also be caused by repeated incomplete emptying of the bladder during urination.    What increases the risk?  This condition is more likely to develop if:    You ignore your need to urinate or hold urine for long periods of time.  You do not empty your bladder completely during urination.  You wipe back to front after urinating or having a bowel movement, if you are female.  You are uncircumcised, if you are male.  You are constipated.  You have a urinary catheter that stays in place (indwelling).  You have a weak defense (immune) system.  You have a medical condition that affects your bowels, kidneys, or bladder.  You have diabetes.  You take antibiotic medicines frequently or for long periods of time, and the antibiotics no longer work well against certain types of infections (antibiotic resistance).  You take medicines that irritate your urinary tract.  You are exposed to chemicals that irritate your urinary tract.  You are female.    What are the signs or symptoms?  Symptoms of this condition include:    Fever.  Frequent urination or passing small amounts of urine frequently.  Needing to urinate urgently.  Pain or burning with urination.  Urine that smells bad or unusual.  Cloudy urine.  Pain in the lower abdomen or back.  Trouble urinating.  Blood in the urine.  Vomiting or being less hungry than normal.  Diarrhea or abdominal pain.  Vaginal discharge, if you are female.    How is this diagnosed?  This condition is diagnosed with a medical history and physical exam. You will also need to provide a urine sample to test your urine. Other tests may be done, including:    Blood tests.  Sexually transmitted disease (STD) testing.    If you have had more than one UTI, a cystoscopy or imaging studies may be done to determine the cause of the infections.    How is this treated?  Treatment for this condition often includes a combination of two or more of the following:    Antibiotic medicine.  Other medicines to treat less common causes of UTI.  Over-the-counter medicines to treat pain.  Drinking enough water to stay hydrated.    Follow these instructions at home:  Take over-the-counter and prescription medicines only as told by your health care provider.  If you were prescribed an antibiotic, take it as told by your health care provider. Do not stop taking the antibiotic even if you start to feel better.  Avoid alcohol, caffeine, tea, and carbonated beverages. They can irritate your bladder.  Drink enough fluid to keep your urine clear or pale yellow.  Keep all follow-up visits as told by your health care provider. This is important.  ImageMake sure to:    Empty your bladder often and completely. Do not hold urine for long periods of time.  Empty your bladder before and after sex.  Wipe from front to back after a bowel movement if you are female. Use each tissue one time when you wipe.    Contact a health care provider if:  You have back pain.  You have a fever.  You feel nauseous or vomit.  Your symptoms do not get better after 3 days.  Your symptoms go away and then return.  Get help right away if:  You have severe back pain or lower abdominal pain.  You are vomiting and cannot keep down any medicines or water.  This information is not intended to replace advice given to you by your health care provider. Make sure you discuss any questions you have with your health care provider.

## 2022-12-06 NOTE — ED ADULT TRIAGE NOTE - PAIN: PRESENCE, MLM
Price (Do Not Change): 0.00
Instructions: This plan will send the code FBSE to the PM system.  DO NOT or CHANGE the price.
Detail Level: Simple
complains of pain/discomfort

## 2023-02-07 NOTE — ED STATDOCS - PRINCIPAL DIAGNOSIS
[facility]  Discharge Note  Short Stay    Procedure(s) (LRB):  COLON (N/A)  COLONOSCOPY, WITH POLYPECTOMY USING SNARE  COLONOSCOPY, WITH HEMORRHAGE CONTROL  COLONOSCOPY, WITH 1 OR MORE BIOPSIES      OUTCOME: Patient tolerated treatment/procedure well without complication and is now ready for discharge.    DISPOSITION: Home or Self Care    FINAL DIAGNOSIS:  Personal history of colonic polyps    FOLLOWUP: In clinic    DISCHARGE INSTRUCTIONS:    Discharge Procedure Orders   Diet general         Clinical Reference Documents Added to Patient Instructions         Document    COLON POLYPECTOMY DISCHARGE INSTRUCTIONS (ENGLISH)    MODERATE AND DEEP SEDATION IN ADULTS (ENGLISH)            TIME SPENT ON DISCHARGE: 10 minutes   Cough

## 2023-03-30 NOTE — ED STATDOCS - NSCAREINITIATED _GEN_ER
Lily Bridges(Attending) [employer] Counseling:  I discussed with the patient the risks of mycophenolate mofetil including but not limited to infection/immunosuppression, GI upset, hypokalemia, hypercholesterolemia, bone marrow suppression, lymphoproliferative disorders, malignancy, GI ulceration/bleed/perforation, colitis, interstitial lung disease, kidney failure, progressive multifocal leukoencephalopathy, and birth defects.  The patient understands that monitoring is required including a baseline creatinine and regular CBC testing. In addition, patient must alert us immediately if symptoms of infection or other concerning signs are noted.

## 2023-05-02 NOTE — ED ADULT NURSE NOTE - NS ED NOTE  TALK SOMEONE YN
Patient had a question about when she needed to have her CT scan done. Told her it should be about a week prior to her MD follow up on 7/27.  Faye verbalizes understanding.    
No

## 2023-10-09 ENCOUNTER — EMERGENCY (EMERGENCY)
Facility: HOSPITAL | Age: 38
LOS: 0 days | Discharge: HOME | End: 2023-10-09
Attending: EMERGENCY MEDICINE
Payer: MEDICAID

## 2023-10-09 VITALS
DIASTOLIC BLOOD PRESSURE: 94 MMHG | TEMPERATURE: 98 F | HEART RATE: 62 BPM | RESPIRATION RATE: 18 BRPM | SYSTOLIC BLOOD PRESSURE: 151 MMHG | OXYGEN SATURATION: 100 %

## 2023-10-09 VITALS — HEIGHT: 64 IN | WEIGHT: 169.98 LBS

## 2023-10-09 DIAGNOSIS — Z90.79 ACQUIRED ABSENCE OF OTHER GENITAL ORGAN(S): Chronic | ICD-10-CM

## 2023-10-09 DIAGNOSIS — N39.0 URINARY TRACT INFECTION, SITE NOT SPECIFIED: ICD-10-CM

## 2023-10-09 DIAGNOSIS — E78.5 HYPERLIPIDEMIA, UNSPECIFIED: ICD-10-CM

## 2023-10-09 DIAGNOSIS — M54.50 LOW BACK PAIN, UNSPECIFIED: ICD-10-CM

## 2023-10-09 LAB
APPEARANCE UR: CLEAR — SIGNIFICANT CHANGE UP
BACTERIA # UR AUTO: NEGATIVE /HPF — SIGNIFICANT CHANGE UP
BILIRUB UR-MCNC: NEGATIVE — SIGNIFICANT CHANGE UP
CAST: 0 /LPF — SIGNIFICANT CHANGE UP (ref 0–4)
COLOR SPEC: YELLOW — SIGNIFICANT CHANGE UP
DIFF PNL FLD: NEGATIVE — SIGNIFICANT CHANGE UP
GLUCOSE UR QL: NEGATIVE MG/DL — SIGNIFICANT CHANGE UP
KETONES UR-MCNC: NEGATIVE MG/DL — SIGNIFICANT CHANGE UP
LEUKOCYTE ESTERASE UR-ACNC: ABNORMAL
NITRITE UR-MCNC: POSITIVE
PH UR: 6.5 — SIGNIFICANT CHANGE UP (ref 5–8)
PROT UR-MCNC: NEGATIVE MG/DL — SIGNIFICANT CHANGE UP
RBC CASTS # UR COMP ASSIST: 1 /HPF — SIGNIFICANT CHANGE UP (ref 0–4)
SP GR SPEC: 1.01 — SIGNIFICANT CHANGE UP (ref 1–1.03)
SQUAMOUS # UR AUTO: 1 /HPF — SIGNIFICANT CHANGE UP (ref 0–5)
UROBILINOGEN FLD QL: 1 MG/DL — SIGNIFICANT CHANGE UP (ref 0.2–1)
WBC UR QL: 36 /HPF — HIGH (ref 0–5)

## 2023-10-09 PROCEDURE — 99283 EMERGENCY DEPT VISIT LOW MDM: CPT

## 2023-10-09 PROCEDURE — 99284 EMERGENCY DEPT VISIT MOD MDM: CPT

## 2023-10-09 PROCEDURE — 81001 URINALYSIS AUTO W/SCOPE: CPT

## 2023-10-09 PROCEDURE — 87086 URINE CULTURE/COLONY COUNT: CPT

## 2023-10-09 RX ORDER — ACETAMINOPHEN 500 MG
650 TABLET ORAL ONCE
Refills: 0 | Status: COMPLETED | OUTPATIENT
Start: 2023-10-09 | End: 2023-10-09

## 2023-10-09 RX ORDER — CEPHALEXIN 500 MG
1 CAPSULE ORAL
Qty: 14 | Refills: 0
Start: 2023-10-09 | End: 2023-10-15

## 2023-10-09 RX ORDER — IBUPROFEN 200 MG
600 TABLET ORAL ONCE
Refills: 0 | Status: COMPLETED | OUTPATIENT
Start: 2023-10-09 | End: 2023-10-09

## 2023-10-09 RX ADMIN — Medication 650 MILLIGRAM(S): at 21:50

## 2023-10-09 RX ADMIN — Medication 600 MILLIGRAM(S): at 21:50

## 2023-10-09 NOTE — ED PROVIDER NOTE - PATIENT PORTAL LINK FT
You can access the FollowMyHealth Patient Portal offered by [facility] by registering at the following website: [email or web address]. By joining [facility]’s FollowMyHealth portal, you will also be able to view your health information using other applications (apps) compatible with our system.

## 2023-10-09 NOTE — ED PROVIDER NOTE - ATTENDING CONTRIBUTION TO CARE
I personally saw the patient with the Resident, and completed the key components of the history and physical exam. I then discussed the management plan with the Resident.

## 2023-10-09 NOTE — ED PROVIDER NOTE - PROGRESS NOTE DETAILS
stable on reassessment. Labs and imaging independently reviewed and interpreted, treating for UTI. antibiotics started. Conversation had with patient regarding results of lab work and imaging. Patient agrees with plan for discharge at this time. Patient agrees to comply with follow up to primary care doctor. Return to ED precautions and discharge instructions discussed with patient with verbal understanding. -DO [place]

## 2023-10-09 NOTE — ED ADULT NURSE NOTE - CINV DISCH MEDS REVIEWED YN
PA Initiation    Medication: LANsoprazole (PREVACID) 30 MG DR capsule  Insurance Company: [employer] - Phone [phone] Fax [phone]  Pharmacy Filling the Rx: [id number] - [place], [place] - [address]  Filling Pharmacy Phone: [phone]  Filling Pharmacy Fax: [phone]  Start Date: 5/31/2022       Yes

## 2023-10-09 NOTE — ED PROVIDER NOTE - OBJECTIVE STATEMENT
37-year-old male with no pertinent past medical history presents for evaluation of 3 days of constant gradual onset of bilateral lower back pain.  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  patient denies any urinary retention or incontinence.  Patient denies any saddle anesthesia.  Patient is ambulatory without difficulty.  Patient has not taken any medication for his pain to include Tylenol or ibuprofen.  Patient states that he has had urinary tract infection in the past and is experiencing occasional dysuria.  Patient notes that when he had a UTI in the past he had bilateral back pain.  Patient is afebrile nontoxic-appearing.

## 2023-10-09 NOTE — ED PROVIDER NOTE - PHYSICAL EXAMINATION
CONSTITUTIONAL: Well appearing, awake, alert, oriented to person, place, time/situation and in no apparent distress.  · ENMT: Airway patent, Nasal mucosa clear. Mouth with normal mucosa. Throat has no vesicles, no oropharyngeal exudates and uvula is midline.  · EYES: Clear bilaterally, pupils equal, round and reactive to light.  · CARDIAC: Normal rate, regular rhythm.  Heart sounds S1, S2.  No murmurs, rubs or gallops.  · RESPIRATORY: Breath sounds clear and equal bilaterally.  · GASTROINTESTINAL: Abdomen soft, non-tender, no guarding.  · MUSCULOSKELETAL: Spine appears normal, range of motion is not limited,   Mild bilateral lumbar paraspinal tenderness without midline tenderness.  · NEUROLOGICAL: Alert and oriented, no focal deficits, no motor or sensory deficits.  · SKIN: Skin normal color for race, warm, dry and intact. No evidence of rash      Patient ambulatory without ataxia

## 2023-10-09 NOTE — ED PROVIDER NOTE - CLINICAL SUMMARY MEDICAL DECISION MAKING FREE TEXT BOX
37-year-old male with no pertinent past medical history presents for evaluation of 3 days of constant gradual onset of bilateral lower back pain.  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  patient denies any urinary retention or incontinence.  Patient denies any saddle anesthesia.  Patient is ambulatory without difficulty.  Patient has not taken any medication for his pain to include Tylenol or ibuprofen.  Patient states that he has had urinary tract infection in the past and is experiencing occasional dysuria.  Patient notes that when he had a UTI in the past he had bilateral back pain.  Patient is afebrile nontoxic-appearing.  No red flags noted to suggest cauda equina syndrome, epidural abscess, or epidural hematoma.   pain is likely musculoskeletal in nature.  Patient mentions that he has urinary symptoms to include dysuria and frequency so we will check urinalysis and culture.  We will give patient ibuprofen and acetaminophen in the emergency department and prescription for muscle relaxer to be used as needed for muscle spasm.

## 2023-10-09 NOTE — ED PROVIDER NOTE - NSFOLLOWUPINSTRUCTIONS_ED_ALL_ED_FT
Back Pain    Back pain is very common in adults. The cause of back pain is rarely dangerous and the pain often gets better over time. The cause of your back pain may not be known and may include strain of muscles or ligaments, degeneration of the spinal disks, or arthritis. Occasionally the pain may radiate down your leg(s). Over-the-counter medicines to reduce pain and inflammation are often the most helpful. Stretching and remaining active frequently helps the healing process.     SEEK IMMEDIATE MEDICAL CARE IF YOU HAVE ANY OF THE FOLLOWING SYMPTOMS: bowel or bladder control problems, unusual weakness or numbness in your arms or legs, nausea or vomiting, abdominal pain, fever, dizziness/lightheadedness. Urinary Tract Infection    A urinary tract infection (UTI) is an infection of any part of the urinary tract, which includes the kidneys, ureters, bladder, and urethra. Risk factors include ignoring your need to urinate, wiping back to front if female, being an uncircumcised male, and having diabetes or a weak immune system. Symptoms include frequent urination, pain or burning with urination, foul smelling urine, cloudy urine, pain in the lower abdomen, blood in the urine, and fever. If you were prescribed an antibiotic medicine, take it as told by your health care provider. Do not stop taking the antibiotic even if you start to feel better.    SEEK IMMEDIATE MEDICAL CARE IF YOU HAVE ANY OF THE FOLLOWING SYMPTOMS: severe back or abdominal pain, fever, inability to keep fluids or medicine down, dizziness/lightheadedness, or a change in mental status.      Back Pain    Back pain is very common in adults. The cause of back pain is rarely dangerous and the pain often gets better over time. The cause of your back pain may not be known and may include strain of muscles or ligaments, degeneration of the spinal disks, or arthritis. Occasionally the pain may radiate down your leg(s). Over-the-counter medicines to reduce pain and inflammation are often the most helpful. Stretching and remaining active frequently helps the healing process.     SEEK IMMEDIATE MEDICAL CARE IF YOU HAVE ANY OF THE FOLLOWING SYMPTOMS: bowel or bladder control problems, unusual weakness or numbness in your arms or legs, nausea or vomiting, abdominal pain, fever, dizziness/lightheadedness.

## 2023-10-11 LAB
CULTURE RESULTS: SIGNIFICANT CHANGE UP
SPECIMEN SOURCE: SIGNIFICANT CHANGE UP

## 2023-12-03 ENCOUNTER — EMERGENCY (EMERGENCY)
Facility: HOSPITAL | Age: 38
LOS: 0 days | Discharge: HOME | End: 2023-12-03
Attending: STUDENT IN AN ORGANIZED HEALTH CARE EDUCATION/TRAINING PROGRAM
Payer: MEDICAID

## 2023-12-03 VITALS
HEART RATE: 71 BPM | RESPIRATION RATE: 18 BRPM | SYSTOLIC BLOOD PRESSURE: 144 MMHG | OXYGEN SATURATION: 98 % | TEMPERATURE: 99 F | DIASTOLIC BLOOD PRESSURE: 73 MMHG

## 2023-12-03 VITALS — HEIGHT: 64 IN | WEIGHT: 169.98 LBS

## 2023-12-03 DIAGNOSIS — R51.9 HEADACHE, UNSPECIFIED: ICD-10-CM

## 2023-12-03 DIAGNOSIS — R07.9 CHEST PAIN, UNSPECIFIED: ICD-10-CM

## 2023-12-03 DIAGNOSIS — E78.5 HYPERLIPIDEMIA, UNSPECIFIED: ICD-10-CM

## 2023-12-03 DIAGNOSIS — Z87.891 PERSONAL HISTORY OF NICOTINE DEPENDENCE: ICD-10-CM

## 2023-12-03 DIAGNOSIS — Z90.79 ACQUIRED ABSENCE OF OTHER GENITAL ORGAN(S): Chronic | ICD-10-CM

## 2023-12-03 DIAGNOSIS — R07.89 OTHER CHEST PAIN: ICD-10-CM

## 2023-12-03 LAB
ALBUMIN SERPL ELPH-MCNC: 3.8 G/DL — SIGNIFICANT CHANGE UP (ref 3.3–5)
ALBUMIN SERPL ELPH-MCNC: 3.8 G/DL — SIGNIFICANT CHANGE UP (ref 3.3–5)
ALP SERPL-CCNC: 49 U/L — SIGNIFICANT CHANGE UP (ref 40–120)
ALP SERPL-CCNC: 49 U/L — SIGNIFICANT CHANGE UP (ref 40–120)
ALT FLD-CCNC: 51 U/L — SIGNIFICANT CHANGE UP (ref 12–78)
ALT FLD-CCNC: 51 U/L — SIGNIFICANT CHANGE UP (ref 12–78)
ANION GAP SERPL CALC-SCNC: 8 MMOL/L — SIGNIFICANT CHANGE UP (ref 5–17)
ANION GAP SERPL CALC-SCNC: 8 MMOL/L — SIGNIFICANT CHANGE UP (ref 5–17)
AST SERPL-CCNC: 27 U/L — SIGNIFICANT CHANGE UP (ref 15–37)
AST SERPL-CCNC: 27 U/L — SIGNIFICANT CHANGE UP (ref 15–37)
BASOPHILS # BLD AUTO: 0.02 K/UL — SIGNIFICANT CHANGE UP (ref 0–0.2)
BASOPHILS # BLD AUTO: 0.02 K/UL — SIGNIFICANT CHANGE UP (ref 0–0.2)
BASOPHILS NFR BLD AUTO: 0.3 % — SIGNIFICANT CHANGE UP (ref 0–2)
BASOPHILS NFR BLD AUTO: 0.3 % — SIGNIFICANT CHANGE UP (ref 0–2)
BILIRUB SERPL-MCNC: 0.8 MG/DL — SIGNIFICANT CHANGE UP (ref 0.2–1.2)
BILIRUB SERPL-MCNC: 0.8 MG/DL — SIGNIFICANT CHANGE UP (ref 0.2–1.2)
BUN SERPL-MCNC: 14 MG/DL — SIGNIFICANT CHANGE UP (ref 7–23)
BUN SERPL-MCNC: 14 MG/DL — SIGNIFICANT CHANGE UP (ref 7–23)
CALCIUM SERPL-MCNC: 9 MG/DL — SIGNIFICANT CHANGE UP (ref 8.5–10.1)
CALCIUM SERPL-MCNC: 9 MG/DL — SIGNIFICANT CHANGE UP (ref 8.5–10.1)
CHLORIDE SERPL-SCNC: 111 MMOL/L — HIGH (ref 96–108)
CHLORIDE SERPL-SCNC: 111 MMOL/L — HIGH (ref 96–108)
CO2 SERPL-SCNC: 24 MMOL/L — SIGNIFICANT CHANGE UP (ref 22–31)
CO2 SERPL-SCNC: 24 MMOL/L — SIGNIFICANT CHANGE UP (ref 22–31)
CREAT SERPL-MCNC: 1.25 MG/DL — SIGNIFICANT CHANGE UP (ref 0.5–1.3)
CREAT SERPL-MCNC: 1.25 MG/DL — SIGNIFICANT CHANGE UP (ref 0.5–1.3)
EGFR: 77 ML/MIN/1.73M2 — SIGNIFICANT CHANGE UP
EGFR: 77 ML/MIN/1.73M2 — SIGNIFICANT CHANGE UP
EOSINOPHIL # BLD AUTO: 0.21 K/UL — SIGNIFICANT CHANGE UP (ref 0–0.5)
EOSINOPHIL # BLD AUTO: 0.21 K/UL — SIGNIFICANT CHANGE UP (ref 0–0.5)
EOSINOPHIL NFR BLD AUTO: 2.7 % — SIGNIFICANT CHANGE UP (ref 0–6)
EOSINOPHIL NFR BLD AUTO: 2.7 % — SIGNIFICANT CHANGE UP (ref 0–6)
GLUCOSE SERPL-MCNC: 106 MG/DL — HIGH (ref 70–99)
GLUCOSE SERPL-MCNC: 106 MG/DL — HIGH (ref 70–99)
HCT VFR BLD CALC: 39.9 % — SIGNIFICANT CHANGE UP (ref 39–50)
HCT VFR BLD CALC: 39.9 % — SIGNIFICANT CHANGE UP (ref 39–50)
HGB BLD-MCNC: 14.1 G/DL — SIGNIFICANT CHANGE UP (ref 13–17)
HGB BLD-MCNC: 14.1 G/DL — SIGNIFICANT CHANGE UP (ref 13–17)
IMM GRANULOCYTES NFR BLD AUTO: 0.3 % — SIGNIFICANT CHANGE UP (ref 0–0.9)
IMM GRANULOCYTES NFR BLD AUTO: 0.3 % — SIGNIFICANT CHANGE UP (ref 0–0.9)
LYMPHOCYTES # BLD AUTO: 3.57 K/UL — HIGH (ref 1–3.3)
LYMPHOCYTES # BLD AUTO: 3.57 K/UL — HIGH (ref 1–3.3)
LYMPHOCYTES # BLD AUTO: 45.6 % — HIGH (ref 13–44)
LYMPHOCYTES # BLD AUTO: 45.6 % — HIGH (ref 13–44)
MCHC RBC-ENTMCNC: 30.7 PG — SIGNIFICANT CHANGE UP (ref 27–34)
MCHC RBC-ENTMCNC: 30.7 PG — SIGNIFICANT CHANGE UP (ref 27–34)
MCHC RBC-ENTMCNC: 35.3 GM/DL — SIGNIFICANT CHANGE UP (ref 32–36)
MCHC RBC-ENTMCNC: 35.3 GM/DL — SIGNIFICANT CHANGE UP (ref 32–36)
MCV RBC AUTO: 86.7 FL — SIGNIFICANT CHANGE UP (ref 80–100)
MCV RBC AUTO: 86.7 FL — SIGNIFICANT CHANGE UP (ref 80–100)
MONOCYTES # BLD AUTO: 0.47 K/UL — SIGNIFICANT CHANGE UP (ref 0–0.9)
MONOCYTES # BLD AUTO: 0.47 K/UL — SIGNIFICANT CHANGE UP (ref 0–0.9)
MONOCYTES NFR BLD AUTO: 6 % — SIGNIFICANT CHANGE UP (ref 2–14)
MONOCYTES NFR BLD AUTO: 6 % — SIGNIFICANT CHANGE UP (ref 2–14)
NEUTROPHILS # BLD AUTO: 3.54 K/UL — SIGNIFICANT CHANGE UP (ref 1.8–7.4)
NEUTROPHILS # BLD AUTO: 3.54 K/UL — SIGNIFICANT CHANGE UP (ref 1.8–7.4)
NEUTROPHILS NFR BLD AUTO: 45.1 % — SIGNIFICANT CHANGE UP (ref 43–77)
NEUTROPHILS NFR BLD AUTO: 45.1 % — SIGNIFICANT CHANGE UP (ref 43–77)
NT-PROBNP SERPL-SCNC: 17 PG/ML — SIGNIFICANT CHANGE UP (ref 0–125)
NT-PROBNP SERPL-SCNC: 17 PG/ML — SIGNIFICANT CHANGE UP (ref 0–125)
PLATELET # BLD AUTO: 358 K/UL — SIGNIFICANT CHANGE UP (ref 150–400)
PLATELET # BLD AUTO: 358 K/UL — SIGNIFICANT CHANGE UP (ref 150–400)
POTASSIUM SERPL-MCNC: 4.1 MMOL/L — SIGNIFICANT CHANGE UP (ref 3.5–5.3)
POTASSIUM SERPL-MCNC: 4.1 MMOL/L — SIGNIFICANT CHANGE UP (ref 3.5–5.3)
POTASSIUM SERPL-SCNC: 4.1 MMOL/L — SIGNIFICANT CHANGE UP (ref 3.5–5.3)
POTASSIUM SERPL-SCNC: 4.1 MMOL/L — SIGNIFICANT CHANGE UP (ref 3.5–5.3)
PROT SERPL-MCNC: 7.4 GM/DL — SIGNIFICANT CHANGE UP (ref 6–8.3)
PROT SERPL-MCNC: 7.4 GM/DL — SIGNIFICANT CHANGE UP (ref 6–8.3)
RBC # BLD: 4.6 M/UL — SIGNIFICANT CHANGE UP (ref 4.2–5.8)
RBC # BLD: 4.6 M/UL — SIGNIFICANT CHANGE UP (ref 4.2–5.8)
RBC # FLD: 12 % — SIGNIFICANT CHANGE UP (ref 10.3–14.5)
RBC # FLD: 12 % — SIGNIFICANT CHANGE UP (ref 10.3–14.5)
SODIUM SERPL-SCNC: 143 MMOL/L — SIGNIFICANT CHANGE UP (ref 135–145)
SODIUM SERPL-SCNC: 143 MMOL/L — SIGNIFICANT CHANGE UP (ref 135–145)
TROPONIN I, HIGH SENSITIVITY RESULT: 5.3 NG/L — SIGNIFICANT CHANGE UP
TROPONIN I, HIGH SENSITIVITY RESULT: 5.3 NG/L — SIGNIFICANT CHANGE UP
WBC # BLD: 7.83 K/UL — SIGNIFICANT CHANGE UP (ref 3.8–10.5)
WBC # BLD: 7.83 K/UL — SIGNIFICANT CHANGE UP (ref 3.8–10.5)
WBC # FLD AUTO: 7.83 K/UL — SIGNIFICANT CHANGE UP (ref 3.8–10.5)
WBC # FLD AUTO: 7.83 K/UL — SIGNIFICANT CHANGE UP (ref 3.8–10.5)

## 2023-12-03 PROCEDURE — 93010 ELECTROCARDIOGRAM REPORT: CPT

## 2023-12-03 PROCEDURE — 84484 ASSAY OF TROPONIN QUANT: CPT

## 2023-12-03 PROCEDURE — 71046 X-RAY EXAM CHEST 2 VIEWS: CPT

## 2023-12-03 PROCEDURE — 36415 COLL VENOUS BLD VENIPUNCTURE: CPT

## 2023-12-03 PROCEDURE — 83880 ASSAY OF NATRIURETIC PEPTIDE: CPT

## 2023-12-03 PROCEDURE — 96374 THER/PROPH/DIAG INJ IV PUSH: CPT

## 2023-12-03 PROCEDURE — 71046 X-RAY EXAM CHEST 2 VIEWS: CPT | Mod: 26

## 2023-12-03 PROCEDURE — 93005 ELECTROCARDIOGRAM TRACING: CPT

## 2023-12-03 PROCEDURE — 85025 COMPLETE CBC W/AUTO DIFF WBC: CPT

## 2023-12-03 PROCEDURE — 80053 COMPREHEN METABOLIC PANEL: CPT

## 2023-12-03 PROCEDURE — 99285 EMERGENCY DEPT VISIT HI MDM: CPT

## 2023-12-03 PROCEDURE — 99285 EMERGENCY DEPT VISIT HI MDM: CPT | Mod: 25

## 2023-12-03 RX ORDER — KETOROLAC TROMETHAMINE 30 MG/ML
15 SYRINGE (ML) INJECTION ONCE
Refills: 0 | Status: DISCONTINUED | OUTPATIENT
Start: 2023-12-03 | End: 2023-12-03

## 2023-12-03 RX ADMIN — Medication 15 MILLIGRAM(S): at 13:01

## 2023-12-03 NOTE — ED STATDOCS - NSFOLLOWUPINSTRUCTIONS_ED_ALL_ED_FT
Dolor de pecho no específico en los adultos  Nonspecific Chest Pain, Adult  El dolor de pecho es thanh sensación molesta, opresiva o dolorosa en el pecho. El dolor se siente duncan thanh aplastamiento, torsión u opresión en el pecho. Thanh persona puede sentir thanh sensación de ardor u hormigueo. El dolor de pecho también se puede sentir en la espalda, el elsa, la mandíbula, el hombro o el brazo. Alaina dolor puede empeorar al moverse, estornudar o respirar profundamente.    El dolor de pecho puede deberse a thanh afección potencialmente mortal. Se debe tratar de inmediato. También puede ser provocado por algo que no es potencialmente mortal. Si tiene dolor de pecho, puede ser difícil saber la diferencia, por lo tanto es importante que obtenga ayuda de inmediato para asegurarse de que no tiene thanh afección grave.    Algunas causas potencialmente mortales del dolor de pecho incluyen:  Infarto de miocardio.  Un desgarro en el vaso sanguíneo principal del cuerpo (disección aórtica).  Inflamación alrededor del corazón (pericarditis).  Un problema en los pulmones, duncan un coágulo de shelbie (embolia pulmonar) o un pulmón colapsado (neumotórax).  Algunas causas de dolor de pecho que no son potencialmente mortales incluyen:  Acidez estomacal.  Ansiedad o estrés.  Daño de los huesos, los músculos y los cartílagos que conforman la pared torácica.  Neumonía o bronquitis.  Culebrilla (virus de la varicela zóster).  El dolor de pecho puede aparecer y desaparecer. También puede ser kermit. Medeiros médico le hará pruebas clínicas y otros estudios para tratar de determinar la causa del dolor. El tratamiento dependerá de la causa del dolor de pecho.    Siga estas instrucciones en medeiros casa:  Medicamentos    Use los medicamentos de venta andree y los recetados solamente duncan se lo haya indicado el médico.  Si le recetaron un antibiótico, tómelo duncan se lo haya indicado el médico. No deje de nyla el antibiótico aunque comience a sentirse mejor.  Actividad    Evite las actividades que le causen dolor de pecho.  No levante ningún objeto que pese más de 10 libras (4,5 kg) o que supere el límite de peso que le hayan indicado, hasta que el médico le diga que puede hacerlo.  Maia reposo duncan se lo haya indicado el médico.  Retome leny actividades normales solo duncan se lo haya indicado el médico. Pregúntele al médico qué actividades son seguras para usted.  Estilo de guillermina    A plate along with examples of foods in a healthy diet.  Silhouette of a person sitting on the floor doing yoga.  No consuma ningún producto que contenga nicotina o tabaco, duncan cigarrillos, cigarrillos electrónicos y tabaco de mascar. Si necesita ayuda para dejar de fumar, consulte al médico.  No josephine alcohol.  Opte por un estilo de guillermina saludable duncan se lo hayan recomendado. Puede incluir:  Practicar actividad física con regularidad. Pídale al médico que le sugiera ejercicios que berenice seguros para usted.  Seguir thanh dieta cardiosaludable. Esta debe incluir muchas frutas y verduras frescas, cereales integrales, proteínas (magras) con bajo contenido de grasa y productos lácteos bajos en grasa. Un nutricionista podrá ayudarlo a hacer elecciones de alimentación saludables.  Mantener un peso saludable.  Controlar cualquier otra afección que tenga, duncan presión arterial joe (hipertensión) o diabetes.  Disminuir el nivel de estrés; por ejemplo, con yoga o técnicas de relajación.  Instrucciones generales    Esté atento a cualquier cambio en los síntomas.  Es medeiros responsabilidad obtener los resultados de cualquier prueba que se haya realizado. Consulte al médico o pregunte en el departamento donde se realizan las pruebas cuándo estarán listos los resultados.  Concurra a todas las visitas de seguimiento duncan se lo haya indicado el médico. [place] es importante.  Es posible que le pidan que se someta a más pruebas si el dolor de pecho no desaparece.  Comuníquese con un médico si:  El dolor de pecho no desaparece.  Se siente deprimido.  Tiene fiebre.  Nota cambios en los síntomas o desarrolla síntomas nuevos.  Solicite ayuda de inmediato si:  El dolor en el pecho empeora.  Tiene tos que empeora o tose con shelbie.  Siente un dolor intenso en el abdomen.  Se desmaya.  Tiene thanh molestia repentina e inexplicable en el pecho.  Tiene molestias repentinas e inexplicables en los brazos, la espalda, el elsa o la mandíbula.  Le falta el aire en cualquier momento.  Comienza a sudar de manera repentina o la piel se le humedece.  Siente náuseas o vomita.  Se siente repentinamente mareado o se desmaya.  Tiene debilidad intensa, o debilidad o fatiga sin explicación.  Siente que el corazón comienza a latir rápidamente o que se saltea latidos.  Estos síntomas pueden representar un problema grave que constituye thanh emergencia. No espere a karthikeyan si los síntomas desaparecen. Solicite atención médica de inmediato. Comuníquese con el servicio de emergencias de medeiros localidad (911 en [place]). No conduzca por leny propios medios hasta el hospital.    Resumen  El dolor de pecho puede ser provocado por thanh afección que es grave y requiere tratamiento urgente. También puede ser provocado por algo que no es potencialmente mortal.  El médico puede hacerle pruebas clínicas y otros estudios para tratar de determinar la causa del dolor.  Siga las instrucciones de medeiros médico sobre nyla medicamentos, hacer cambios en medeiros estilo de guillermina y recibir tratamiento de urgencia si los síntomas empeoran.  Concurra a todas las visitas de seguimiento duncan se lo haya indicado el médico. [place] incluye las visitas para realizarle otras pruebas si el dolor de pecho no desaparece.  Esta información no tiene duncan fin reemplazar el consejo del médico. Asegúrese de hacerle al médico cualquier pregunta que tenga. Dolor de pecho no específico en los adultos  Nonspecific Chest Pain, Adult  El dolor de pecho es thanh sensación molesta, opresiva o dolorosa en el pecho. El dolor se siente duncan thanh aplastamiento, torsión u opresión en el pecho. Thanh persona puede sentir thanh sensación de ardor u hormigueo. El dolor de pecho también se puede sentir en la espalda, el elsa, la mandíbula, el hombro o el brazo. Alaina dolor puede empeorar al moverse, estornudar o respirar profundamente.    El dolor de pecho puede deberse a thanh afección potencialmente mortal. Se debe tratar de inmediato. También puede ser provocado por algo que no es potencialmente mortal. Si tiene dolor de pecho, puede ser difícil saber la diferencia, por lo tanto es importante que obtenga ayuda de inmediato para asegurarse de que no tiene thanh afección grave.    Algunas causas potencialmente mortales del dolor de pecho incluyen:  Infarto de miocardio.  Un desgarro en el vaso sanguíneo principal del cuerpo (disección aórtica).  Inflamación alrededor del corazón (pericarditis).  Un problema en los pulmones, duncan un coágulo de shelbie (embolia pulmonar) o un pulmón colapsado (neumotórax).  Algunas causas de dolor de pecho que no son potencialmente mortales incluyen:  Acidez estomacal.  Ansiedad o estrés.  Daño de los huesos, los músculos y los cartílagos que conforman la pared torácica.  Neumonía o bronquitis.  Culebrilla (virus de la varicela zóster).  El dolor de pecho puede aparecer y desaparecer. También puede ser kermit. Medeiros médico le hará pruebas clínicas y otros estudios para tratar de determinar la causa del dolor. El tratamiento dependerá de la causa del dolor de pecho.    Siga estas instrucciones en medeiros casa:  Medicamentos    Use los medicamentos de venta andree y los recetados solamente duncan se lo haya indicado el médico.  Si le recetaron un antibiótico, tómelo duncan se lo haya indicado el médico. No deje de nyla el antibiótico aunque comience a sentirse mejor.  Actividad    Evite las actividades que le causen dolor de pecho.  No levante ningún objeto que pese más de 10 libras (4,5 kg) o que supere el límite de peso que le hayan indicado, hasta que el médico le diga que puede hacerlo.  Maia reposo duncan se lo haya indicado el médico.  Retome leny actividades normales solo duncan se lo haya indicado el médico. Pregúntele al médico qué actividades son seguras para usted.  Estilo de guillermina    A plate along with examples of foods in a healthy diet.  Silhouette of a person sitting on the floor doing yoga.  No consuma ningún producto que contenga nicotina o tabaco, duncan cigarrillos, cigarrillos electrónicos y tabaco de mascar. Si necesita ayuda para dejar de fumar, consulte al médico.  No josephine alcohol.  Opte por un estilo de guillermina saludable duncan se lo hayan recomendado. Puede incluir:  Practicar actividad física con regularidad. Pídale al médico que le sugiera ejercicios que berenice seguros para usted.  Seguir thanh dieta cardiosaludable. Esta debe incluir muchas frutas y verduras frescas, cereales integrales, proteínas (magras) con bajo contenido de grasa y productos lácteos bajos en grasa. Un nutricionista podrá ayudarlo a hacer elecciones de alimentación saludables.  Mantener un peso saludable.  Controlar cualquier otra afección que tenga, duncan presión arterial joe (hipertensión) o diabetes.  Disminuir el nivel de estrés; por ejemplo, con yoga o técnicas de relajación.  Instrucciones generales    Esté atento a cualquier cambio en los síntomas.  Es medeiros responsabilidad obtener los resultados de cualquier prueba que se haya realizado. Consulte al médico o pregunte en el departamento donde se realizan las pruebas cuándo estarán listos los resultados.  Concurra a todas las visitas de seguimiento duncan se lo haya indicado el médico. [place] es importante.  Es posible que le pidan que se someta a más pruebas si el dolor de pecho no desaparece.  Comuníquese con un médico si:  El dolor de pecho no desaparece.  Se siente deprimido.  Tiene fiebre.  Nota cambios en los síntomas o desarrolla síntomas nuevos.  Solicite ayuda de inmediato si:  El dolor en el pecho empeora.  Tiene tos que empeora o tose con shelbie.  Siente un dolor intenso en el abdomen.  Se desmaya.  Tiene thanh molestia repentina e inexplicable en el pecho.  Tiene molestias repentinas e inexplicables en los brazos, la espalda, el elsa o la mandíbula.  Le falta el aire en cualquier momento.  Comienza a sudar de manera repentina o la piel se le humedece.  Siente náuseas o vomita.  Se siente repentinamente mareado o se desmaya.  Tiene debilidad intensa, o debilidad o fatiga sin explicación.  Siente que el corazón comienza a latir rápidamente o que se saltea latidos.  Estos síntomas pueden representar un problema grave que constituye thanh emergencia. No espere a karthikeyan si los síntomas desaparecen. Solicite atención médica de inmediato. Comuníquese con el servicio de emergencias de medeiros localidad (911 en [place]). No conduzca por leny propios medios hasta el hospital.    Resumen  El dolor de pecho puede ser provocado por thanh afección que es grave y requiere tratamiento urgente. También puede ser provocado por algo que no es potencialmente mortal.  El médico puede hacerle pruebas clínicas y otros estudios para tratar de determinar la causa del dolor.  Siga las instrucciones de medeiros médico sobre nyla medicamentos, hacer cambios en medeiros estilo de guillermina y recibir tratamiento de urgencia si los síntomas empeoran.  Concurra a todas las visitas de seguimiento duncan se lo haya indicado el médico. [place] incluye las visitas para realizarle otras pruebas si el dolor de pecho no desaparece.  Esta información no tiene duncan fin reemplazar el consejo del médico. Asegúrese de hacerle al médico cualquier pregunta que tenga. Dolor de pecho no específico en los adultos  Nonspecific Chest Pain, Adult  El dolor de pecho es thanh sensación molesta, opresiva o dolorosa en el pecho. El dolor se siente duncan thanh aplastamiento, torsión u opresión en el pecho. Thanh persona puede sentir thanh sensación de ardor u hormigueo. El dolor de pecho también se puede sentir en la espalda, el elsa, la mandíbula, el hombro o el brazo. Alaina dolor puede empeorar al moverse, estornudar o respirar profundamente.    El dolor de pecho puede deberse a htanh afección potencialmente mortal. Se debe tratar de inmediato. También puede ser provocado por algo que no es potencialmente mortal. Si tiene dolor de pecho, puede ser difícil saber la diferencia, por lo tanto es importante que obtenga ayuda de inmediato para asegurarse de que no tiene thanh afección grave.    Algunas causas potencialmente mortales del dolor de pecho incluyen:  Infarto de miocardio.  Un desgarro en el vaso sanguíneo principal del cuerpo (disección aórtica).  Inflamación alrededor del corazón (pericarditis).  Un problema en los pulmones, duncan un coágulo de shelbie (embolia pulmonar) o un pulmón colapsado (neumotórax).  Algunas causas de dolor de pecho que no son potencialmente mortales incluyen:  Acidez estomacal.  Ansiedad o estrés.  Daño de los huesos, los músculos y los cartílagos que conforman la pared torácica.  Neumonía o bronquitis.  Culebrilla (virus de la varicela zóster).  El dolor de pecho puede aparecer y desaparecer. También puede ser kermit. Medeiros médico le hará pruebas clínicas y otros estudios para tratar de determinar la causa del dolor. El tratamiento dependerá de la causa del dolor de pecho.    Siga estas instrucciones en medeiros casa:  Medicamentos    Use los medicamentos de venta andree y los recetados solamente duncan se lo haya indicado el médico.  Si le recetaron un antibiótico, tómelo duncan se lo haya indicado el médico. No deje de nyla el antibiótico aunque comience a sentirse mejor.  Actividad    Evite las actividades que le causen dolor de pecho.  No levante ningún objeto que pese más de 10 libras (4,5 kg) o que supere el límite de peso que le hayan indicado, hasta que el médico le diga que puede hacerlo.  Maia reposo duncan se lo haya indicado el médico.  Retome leny actividades normales solo duncan se lo haya indicado el médico. Pregúntele al médico qué actividades son seguras para usted.  Estilo de guillermina    A plate along with examples of foods in a healthy diet.  Silhouette of a person sitting on the floor doing yoga.  No consuma ningún producto que contenga nicotina o tabaco, duncan cigarrillos, cigarrillos electrónicos y tabaco de mascar. Si necesita ayuda para dejar de fumar, consulte al médico.  No josephine alcohol.  Opte por un estilo de guillermina saludable duncan se lo hayan recomendado. Puede incluir:  Practicar actividad física con regularidad. Pídale al médico que le sugiera ejercicios que berenice seguros para usted.  Seguir thanh dieta cardiosaludable. Esta debe incluir muchas frutas y verduras frescas, cereales integrales, proteínas (magras) con bajo contenido de grasa y productos lácteos bajos en grasa. Un nutricionista podrá ayudarlo a hacer elecciones de alimentación saludables.  Mantener un peso saludable.  Controlar cualquier otra afección que tenga, duncan presión arterial joe (hipertensión) o diabetes.  Disminuir el nivel de estrés; por ejemplo, con yoga o técnicas de relajación.  Instrucciones generales    Esté atento a cualquier cambio en los síntomas.  Es medeiros responsabilidad obtener los resultados de cualquier prueba que se haya realizado. Consulte al médico o pregunte en el departamento donde se realizan las pruebas cuándo estarán listos los resultados.  Concurra a todas las visitas de seguimiento duncan se lo haya indicado el médico. [place] es importante.  Es posible que le pidan que se someta a más pruebas si el dolor de pecho no desaparece.  Comuníquese con un médico si:  El dolor de pecho no desaparece.  Se siente deprimido.  Tiene fiebre.  Nota cambios en los síntomas o desarrolla síntomas nuevos.  Solicite ayuda de inmediato si:  El dolor en el pecho empeora.  Tiene tos que empeora o tose con shelbie.  Siente un dolor intenso en el abdomen.  Se desmaya.  Tiene thanh molestia repentina e inexplicable en el pecho.  Tiene molestias repentinas e inexplicables en los brazos, la espalda, el elsa o la mandíbula.  Le falta el aire en cualquier momento.  Comienza a sudar de manera repentina o la piel se le humedece.  Siente náuseas o vomita.  Se siente repentinamente mareado o se desmaya.  Tiene debilidad intensa, o debilidad o fatiga sin explicación.  Siente que el corazón comienza a latir rápidamente o que se saltea latidos.  Estos síntomas pueden representar un problema grave que constituye thanh emergencia. No espere a karthikeyan si los síntomas desaparecen. Solicite atención médica de inmediato. Comuníquese con el servicio de emergencias de medeiros localidad (911 en [place] No conduzca por leny propios medios hasta el hospital.    Resumen  El dolor de pecho puede ser provocado por thanh afección que es grave y requiere tratamiento urgente. También puede ser provocado por algo que no es potencialmente mortal.  El médico puede hacerle pruebas clínicas y otros estudios para tratar de determinar la causa del dolor.  Siga las instrucciones de medeiros médico sobre nyla medicamentos, hacer cambios en medeiros estilo de guillermina y recibir tratamiento de urgencia si los síntomas empeoran.  Concurra a todas las visitas de seguimiento duncan se lo haya indicado el médico. [place] incluye las visitas para realizarle otras pruebas si el dolor de pecho no desaparece.  Esta información no tiene duncan fin reemplazar el consejo del médico. Asegúrese de hacerle al médico cualquier pregunta que tenga.

## 2023-12-03 NOTE — ED STATDOCS - ATTENDING APP SHARED VISIT CONTRIBUTION OF CARE
I, Humberto Daugherty DO, personally saw the patient with ACP.  I have personally performed a face to face diagnostic evaluation on this patient.  I have reviewed the ACP note and agree with the history, exam, and plan of care, except as noted.   The initial assessment was performed by myself and then the patient was handed off to the ACP. The patient was followed and re-evaluated by the ACP. All labs, imaging and procedures were evaluated and performed by the ACP and I was available for consultation if any questions in the patients care came up.

## 2023-12-03 NOTE — ED STATDOCS - CLINICAL SUMMARY MEDICAL DECISION MAKING FREE TEXT BOX
38-year-old male with hyperlipidemia presents for midsternal chest pain, nonexertional, nonpleuritic, has been present for the past few days.  EKG nonischemic.  Unlikely to be ACS but will rule out with blood work, chest x-ray rule out pneumothorax or cardiomegaly, pain control.  No concern for esophageal perforation, unlikely pericarditis, doubt aortic pathology.  Anticipate discharge with cardiology follow-up if work-up negative.

## 2023-12-03 NOTE — ED STATDOCS - PHYSICAL EXAMINATION
GENERAL: A&Ox4, non-toxic appearing, no acute distress  HEENT: NCAT, EOMI, oral mucosa moist, normal conjunctiva  RESP: CTAB, no respiratory distress, no wheezes/rhonchi/rales, speaking in full sentences  CV: RRR, no murmurs/rubs/gallops  ABDOMEN: soft, non-tender, non-distended, no guarding, no rebound tenderness  MSK: no visible deformities  NEURO: no focal sensory or motor deficits, CN 2-12 grossly intact  SKIN: warm, normal color, well perfused, no rash  PSYCH: normal affect

## 2023-12-03 NOTE — ED STATDOCS - ATTENDING SHARED VISIT SELECTORS
SUBJECTIVE:       Chief Complaint   Patient presents with   • Cough     with chest congestion, x 4 days   • Fever     low grade fever   • Sinus Problem     runny nose           Patient is a 13 year old female who presents with complains of  URI issues that has been present for 4 days     Symptoms started  with  nasal congestion post nasal drip cough .    Then progressed to coughing Is worse.     Cough symptomS:  cough  ,  productive, persistent, worse at night    FEVER:  .              ROS:          EYE Problems: none.  EAR Problems: no ear pain.  Lungs: no breathing issues or wheezing.  Heart:  No chest pain.  Abdomen: No abdominal pain or vomiting or diarrhea.     SKIN:  No rashes.           PMH:    Patient Active Problem List   Diagnosis   • Bell's palsy   • Congenital anomaly of heart   • Facial asymmetry   • Pneumonia   • Reactive airway disease   • Subglottic stenosis           ALLERGIES:    ALLERGIES:  Patient has no known allergies.      MEDICATION:  Current Outpatient Medications   Medication Sig Dispense Refill   • cefdinir (OMNICEF) 300 MG capsule Take 1 capsule by mouth 2 times daily for 10 days. 20 capsule 0   • doxycycline hyclate (TARGADOX) 50 MG Tab Take 1 tablet by mouth 2 times daily. 60 tablet 1   • clindamycin (CLEOCIN T) 1 % lotion Apply twice daily to the affected areas 120 mL 11     No current facility-administered medications for this visit.           Social History:  Social History     Tobacco Use   • Smoking status: Never Smoker   • Smokeless tobacco: Never Used   Substance Use Topics   • Alcohol use: Not on file   • Drug use: Never           OBJECTIVE:    Vitals:    06/09/22 1326   BP: (!) 108/56   Pulse: 80   Resp: (!) 16   Temp: (!) 96.3 °F (35.7 °C)   SpO2: 100%   Weight: (!) 69.4 kg (153 lb)   PainSc:  0         General appearance: Alert and in no apparent distress.    HEENT:  Right Ear Exam: External auditory canal without erythema, swelling, or discharge., TM's intact  without erythema, bulging, retraction, or fluid level.  Left Ear Exam:  External auditory canal without erythema, swelling, or discharge., TM's intact without erythema, bulging, retraction, or fluid level.   Eyes:   Clear without injection or discharge  Throat: pink and moist without edema, erythema or exudates or signs of abscess.      Neck: supple without cervical lymphadenopathy.  No meningismus.    Lungs: clear to auscultation without wheezes, rhonchi, rales, retractions or stridors.  Heart:  Regular rate and rhythm.  Extremities: unremarkable  Skin: no rash     Tests:        Results for orders placed or performed in visit on 06/09/22   POCT SARS-COV-2 ANTIGEN   Result Value Ref Range    POCT SARS-COV-2 ANTIGEN Not Detected Not Detected    Internal Procedural Controls Acceptable Yes     TEST LOT NUMBER [id number]     TEST LOT EXPIRATION DATE 08/17/1959        Orders Placed This Encounter   • POCT SARS-COV-2 ANTIGEN   • cefdinir (OMNICEF) 300 MG capsule           COVID-19 rapid test NEGATIVE. Results were independently reviewed and interpreted and discussed during patient evaluation             Patient medical history/allergies/medication were reviewed in medical records and verified with patient during this visit.           Independent Historian:  parent            ASSESSMENT/PLAN:       ED Diagnosis   1. Sinusitis, unspecified chronicity, unspecified location     2. Cough  POCT SARS-COV-2 ANTIGEN           Discussed with parent the diagnosis and treatment and plan at the time of visit.  Parent was given the AVS summary after it was was reviewed with patient.  Parent  voiced understanding and was in an agreement with plan. Parent  was encouraged to contact our office with any questions or issues regarding treatment and care.     PLAN:       See orders for medications prescribed.  Side effects of medications discussed with patient in detail and questions addressed.      Orders Placed This Encounter   • POCT  SARS-COV-2 ANTIGEN   • cefdinir (OMNICEF) 300 MG capsule           Take your medication  Recommend probiotics when taking antibiotics or yogurt  May take tylenol if needed  Symptoms should improve gradually over the next 3-5 days.       If symptoms  persist or  worsen then the patient will need to reassessed.  If the patient cannot be seen by their PCP, then the patient is recommended to return to Immediate Care or to seek care in Emergency Room if Immediate Care is not available.                 see AVS for details of plan/treatment.          Kaye Lynch MD                    History/Exam/Medical Decision Making

## 2023-12-03 NOTE — ED STATDOCS - OBJECTIVE STATEMENT
37 y/o w/ a PMHx of HLD presents to the ED c/o constant CP and HA x3 days. Denies heavy lifting, SOB, dizziness, rash, or hx of similar symptoms. No other complaints at this time. Former smoker, states he stopped x6 months ago.

## 2023-12-03 NOTE — ED STATDOCS - PATIENT PORTAL LINK FT
You can access the FollowMyHealth Patient Portal offered by [facility] by registering at the following website: [email or web address]. By joining [employer]’s FollowMyHealth portal, you will also be able to view your health information using other applications (apps) compatible with our system. You can access the FollowMyHealth Patient Portal offered by [facility] by registering at the following website: [email or web address]. By joining [employer]’s FollowMyHealth portal, you will also be able to view your health information using other applications (apps) compatible with our system. You can access the FollowMyHealth Patient Portal offered by [facility] by registering at the following website: [email or web address]. By joining [employer]’s FollowMyHealth portal, you will also be able to view your health information using other applications (apps) compatible with our system.

## 2023-12-03 NOTE — ED ADULT NURSE NOTE - NSFALLUNIVINTERV_ED_ALL_ED
Bed/Stretcher in lowest position, wheels locked, appropriate side rails in place/Call bell, personal items and telephone in reach/Instruct patient to call for assistance before getting out of bed/chair/stretcher/Non-slip footwear applied when patient is off stretcher/[place] to call system/Physically safe environment - no spills, clutter or unnecessary equipment/Purposeful proactive rounding/Room/bathroom lighting operational, light cord in reach Bed/Stretcher in lowest position, wheels locked, appropriate side rails in place/Call bell, personal items and telephone in reach/Instruct patient to call for assistance before getting out of bed/chair/stretcher/Non-slip footwear applied when patient is off stretcher/[place] to call system/Physically safe environment - no spills, clutter or unnecessary equipment/Purposeful proactive rounding/Room/bathroom lighting operational, light cord in reach Bed/Stretcher in lowest position, wheels locked, appropriate side rails in place/Call bell, personal items and telephone in reach/Instruct patient to call for assistance before getting out of bed/chair/stretcher/Non-slip footwear applied when patient is off stretcher/[place] to call system/Physically safe environment - no spills, clutter or unnecessary equipment/Purposeful proactive rounding/Room/bathroom lighting operational, light cord in reach

## 2023-12-03 NOTE — ED STATDOCS - PROGRESS NOTE DETAILS
Patient seen and evaluated, ED attending note and orders reviewed, will continue with patient follow up and care -Monserrat Bland PA-C labs WNL< trop negative, CXR with NAD, pt feeling better after meds, denies CP currently, will dc home with pmd and cardio f/u return precautions given  Monserrat Bland PA-C

## 2023-12-11 ENCOUNTER — EMERGENCY (EMERGENCY)
Facility: HOSPITAL | Age: 38
LOS: 0 days | Discharge: HOME | End: 2023-12-11
Attending: EMERGENCY MEDICINE
Payer: MEDICAID

## 2023-12-11 VITALS
SYSTOLIC BLOOD PRESSURE: 125 MMHG | TEMPERATURE: 98 F | RESPIRATION RATE: 18 BRPM | HEART RATE: 54 BPM | DIASTOLIC BLOOD PRESSURE: 59 MMHG | OXYGEN SATURATION: 99 %

## 2023-12-11 VITALS — WEIGHT: 169.98 LBS | HEIGHT: 64 IN

## 2023-12-11 DIAGNOSIS — Z90.79 ACQUIRED ABSENCE OF OTHER GENITAL ORGAN(S): Chronic | ICD-10-CM

## 2023-12-11 DIAGNOSIS — R51.9 HEADACHE, UNSPECIFIED: ICD-10-CM

## 2023-12-11 DIAGNOSIS — E78.5 HYPERLIPIDEMIA, UNSPECIFIED: ICD-10-CM

## 2023-12-11 DIAGNOSIS — Z87.440 PERSONAL HISTORY OF URINARY (TRACT) INFECTIONS: ICD-10-CM

## 2023-12-11 PROCEDURE — 96375 TX/PRO/DX INJ NEW DRUG ADDON: CPT

## 2023-12-11 PROCEDURE — 70450 CT HEAD/BRAIN W/O DYE: CPT | Mod: 26,MA

## 2023-12-11 PROCEDURE — 70450 CT HEAD/BRAIN W/O DYE: CPT | Mod: MA

## 2023-12-11 PROCEDURE — 99284 EMERGENCY DEPT VISIT MOD MDM: CPT | Mod: 25

## 2023-12-11 PROCEDURE — 99284 EMERGENCY DEPT VISIT MOD MDM: CPT

## 2023-12-11 PROCEDURE — 96374 THER/PROPH/DIAG INJ IV PUSH: CPT

## 2023-12-11 RX ORDER — ACETAMINOPHEN 500 MG
1000 TABLET ORAL ONCE
Refills: 0 | Status: COMPLETED | OUTPATIENT
Start: 2023-12-11 | End: 2023-12-11

## 2023-12-11 RX ORDER — METOCLOPRAMIDE HCL 10 MG
10 TABLET ORAL ONCE
Refills: 0 | Status: COMPLETED | OUTPATIENT
Start: 2023-12-11 | End: 2023-12-11

## 2023-12-11 RX ORDER — SODIUM CHLORIDE 9 MG/ML
1000 INJECTION INTRAMUSCULAR; INTRAVENOUS; SUBCUTANEOUS ONCE
Refills: 0 | Status: DISCONTINUED | OUTPATIENT
Start: 2023-12-11 | End: 2023-12-11

## 2023-12-11 RX ORDER — SODIUM CHLORIDE 9 MG/ML
1000 INJECTION INTRAMUSCULAR; INTRAVENOUS; SUBCUTANEOUS ONCE
Refills: 0 | Status: COMPLETED | OUTPATIENT
Start: 2023-12-11 | End: 2023-12-11

## 2023-12-11 RX ADMIN — SODIUM CHLORIDE 1000 MILLILITER(S): 9 INJECTION INTRAMUSCULAR; INTRAVENOUS; SUBCUTANEOUS at 22:19

## 2023-12-11 RX ADMIN — Medication 400 MILLIGRAM(S): at 22:18

## 2023-12-11 RX ADMIN — Medication 10 MILLIGRAM(S): at 22:34

## 2023-12-11 NOTE — ED STATDOCS - OBJECTIVE STATEMENT
37 yo M PMHx HLD, urethritis, UTI, renal vascular disease, s/p unilateral orchiectomy, presents with CC of headache. Patient states that headache began 8 days ago.  Patient complains of pain all over the head.  Denies head trauma, strenuous exercise.  Denies prior history of migraines.  He denies vision changes, nausea, vomiting, weakness or paresthesias in the limbs, speech abnormality, or any other symptoms.  Patient has been taking Motrin for headache without improvement.  Of note patient was in the emergency department on December 3 with acute chief complaint of chest pain and headache.  Patient underwent a chest pain workup which was negative at that time.  No other concerns. 39 yo M PMHx HLD, urethritis, UTI, renal vascular disease, s/p unilateral orchiectomy, presents with CC of headache. Patient states that headache began 8 days ago.  Patient complains of pain all over the head.  Denies head trauma, strenuous exercise.  Denies prior history of migraines.  He denies vision changes, nausea, vomiting, weakness or paresthesias in the limbs, speech abnormality, or any other symptoms.  Patient has been taking Motrin for headache without improvement.  Of note patient was in the emergency department on December 3 with acute chief complaint of chest pain and headache.  Patient underwent a chest pain workup which was negative at that time.  No other concerns.

## 2023-12-11 NOTE — ED ADULT TRIAGE NOTE - CHIEF COMPLAINT QUOTE
pt presented to er c/o migraine x8 days. pt states he has never had one before. pt endorses dizziness, light sensitivity. denies weakness, visual disturbances, cp, sob. pt last took advil at 1530 without relief. BEFAST-. NKA. pt denies significant pmhx. pt is a/o4 and well appearing in triage.

## 2023-12-11 NOTE — ED STATDOCS - CLINICAL SUMMARY MEDICAL DECISION MAKING FREE TEXT BOX
This patient presents to the emergency department with a CC of headache.    Presentation is most consistent with benign headache. Differential diagnosis includes migraine versus tension-type headache. No headache red flags. Neurologic exam without evidence of meningismus, nor focal neurologic findings. Presentation not consistent with acute intracranial bleed to include SAH (lack of risk factors, headache history). Presentation not consistent with acute CNS infection to include meningitis or brain abscess, Temporal arteritis, central venous thrombosis, acute angle closure glaucoma, carotid/vertebral dissection unlikely given history and physical exam findings. Presentation is not consistent with other acute, emergent causes of headache at this time; however given no prior history of migraines/headaches in past, and no workup on last ED visit, will obtain CT head to r/o acute abnormality. Plan to treat symptomatically with pain medication and will reevaluate.    Plan: migraine cocktail for pain, IV fluid bolus, CT head, serial reassessment

## 2023-12-11 NOTE — ED ADULT NURSE NOTE - OBJECTIVE STATEMENT
pt presented to ED c/o headache x8 days. pt states he has never had one before. pt endorses dizziness, light sensitivity. denies weakness, visual disturbances, cp, sob. pt last took advil at 1530 without relief. BEFAST-.

## 2023-12-11 NOTE — ED STATDOCS - PHYSICAL EXAMINATION
Physical Exam:  Gen: NAD, non-toxic appearing, able to ambulate without assistance  Head: NCAT  HEENT: EOMI, PEERLA, normal conjunctiva, tongue midline, oral mucosa moist  Lung: CTAB, no respiratory distress, no wheezes/rhonchi/rales B/L, speaking in full sentences  CV: RRR, no murmurs, rubs or gallops, distal pulses 2+ b/l  Abd: soft, nontender, no distention, no guarding, no rigidity, no rebound tenderness  MSK: no visible deformities, ROM normal in UE/LE  Skin: Warm, well perfused, no rash  Psych: normal affect, calm    Neurological exam:  HF: A x O x 3. Appropriately interactive, normal affect. Speech fluent, No Aphasia, No Dysarthria, Naming /repetition intact   CN: MALINI, EOMI, VFF, facial sensation normal, no facial droop, tongue midline, Palate moves equally, SCM equal bilaterally  Motor: No pronator drift, Strength 5/5 in all 4 ext, normal bulk and tone, no tremor, rigidity or bradykinesia.    Sens: Intact to light touch to bilateral face, upper, and lower extremities  Reflexes: Symmetric and normal . bicep 2+, brachioradialis 2+, patellar 2+, and ankle 2+, downgoing toes b/l  Coord: No dysmetria, ADENIKE intact   Gait/Balance: Normal, no ataxia

## 2023-12-11 NOTE — ED ADULT NURSE NOTE - NSFALLUNIVINTERV_ED_ALL_ED
Bed/Stretcher in lowest position, wheels locked, appropriate side rails in place/Call bell, personal items and telephone in reach/Instruct patient to call for assistance before getting out of bed/chair/stretcher/Non-slip footwear applied when patient is off stretcher/[place] to call system/Physically safe environment - no spills, clutter or unnecessary equipment/Purposeful proactive rounding/Room/bathroom lighting operational, light cord in reach Bed/Stretcher in lowest position, wheels locked, appropriate side rails in place/Call bell, personal items and telephone in reach/Instruct patient to call for assistance before getting out of bed/chair/stretcher/Non-slip footwear applied when patient is off stretcher/[place] to call system/Physically safe environment - no spills, clutter or unnecessary equipment/Purposeful proactive rounding/Room/bathroom lighting operational, light cord in reach

## 2023-12-11 NOTE — ED STATDOCS - PATIENT PORTAL LINK FT
You can access the FollowMyHealth Patient Portal offered by [facility] by registering at the following website: [email or web address]. By joining [employer]’s FollowMyHealth portal, you will also be able to view your health information using other applications (apps) compatible with our system. You can access the FollowMyHealth Patient Portal offered by [facility] by registering at the following website: [email or web address]. By joining [employer]’s FollowMyHealth portal, you will also be able to view your health information using other applications (apps) compatible with our system.

## 2023-12-11 NOTE — ED STATDOCS - PROGRESS NOTE DETAILS
The patient was seen and reassessed. The patient symptomatically improved. AAOX3, NAD, VSS. ?  DC Checklist:  -VS WNL, or basline for patient  -Diagnosis: benign headache  ??-Imaging: reviewed, CT negative for acute process, all results/incidental findings discussed with patient and/or family; discussed preliminary nature of some imaging results, pending official radiology report.?  -Reevaluation: patient well on reexam, okay for discharge  -Ambulated at baseline in department  -Follow-up discussed, recommend PCP f/u as needed  -All questions answered and strict return precautions discussed. Patient verbalized understanding and satisfaction and agrees with plan.

## 2024-11-20 ENCOUNTER — EMERGENCY (EMERGENCY)
Facility: HOSPITAL | Age: 39
LOS: 0 days | Discharge: HOME | End: 2024-11-20
Attending: EMERGENCY MEDICINE
Payer: COMMERCIAL

## 2024-11-20 VITALS — HEIGHT: 63 IN

## 2024-11-20 VITALS
RESPIRATION RATE: 16 BRPM | HEART RATE: 60 BPM | SYSTOLIC BLOOD PRESSURE: 129 MMHG | WEIGHT: 173.72 LBS | TEMPERATURE: 98 F | DIASTOLIC BLOOD PRESSURE: 78 MMHG | OXYGEN SATURATION: 100 %

## 2024-11-20 DIAGNOSIS — Z90.79 ACQUIRED ABSENCE OF OTHER GENITAL ORGAN(S): Chronic | ICD-10-CM

## 2024-11-20 PROCEDURE — 71046 X-RAY EXAM CHEST 2 VIEWS: CPT | Mod: 26

## 2024-11-20 PROCEDURE — 99283 EMERGENCY DEPT VISIT LOW MDM: CPT | Mod: 25

## 2024-11-20 PROCEDURE — 99284 EMERGENCY DEPT VISIT MOD MDM: CPT

## 2024-11-20 PROCEDURE — 71046 X-RAY EXAM CHEST 2 VIEWS: CPT

## 2024-11-20 PROCEDURE — 96372 THER/PROPH/DIAG INJ SC/IM: CPT

## 2024-11-20 RX ORDER — IBUPROFEN 200 MG
1 TABLET ORAL
Qty: 15 | Refills: 0
Start: 2024-11-20 | End: 2024-11-24

## 2024-11-20 RX ORDER — CYCLOBENZAPRINE HYDROCHLORIDE 30 MG/1
1 CAPSULE, EXTENDED RELEASE ORAL
Qty: 10 | Refills: 0
Start: 2024-11-20

## 2024-11-20 RX ORDER — KETOROLAC TROMETHAMINE 30 MG/ML
30 INJECTION INTRAMUSCULAR; INTRAVENOUS ONCE
Refills: 0 | Status: DISCONTINUED | OUTPATIENT
Start: 2024-11-20 | End: 2024-11-20

## 2024-11-20 RX ADMIN — KETOROLAC TROMETHAMINE 30 MILLIGRAM(S): 30 INJECTION INTRAMUSCULAR; INTRAVENOUS at 18:15

## 2024-11-20 NOTE — ED STATDOCS - ATTENDING APP SHARED VISIT CONTRIBUTION OF CARE
I,Wilbur Foote MD,  performed the initial face to face bedside interview with this patient regarding history of present illness, review of symptoms and relevant past medical, social and family history.  I completed an independent physical examination.  I was the initial provider who evaluated this patient. I have signed out the follow up of any pending tests (i.e. labs, radiological studies) to the ACP.  I have communicated the patient’s plan of care and disposition with the ACP.  The history, relevant review of systems, past medical and surgical history, medical decision making, and physical examination was documented by the scribe in my presence and I attest to the accuracy of the documentation.

## 2024-11-20 NOTE — ED ADULT NURSE NOTE - NSFALLUNIVINTERV_ED_ALL_ED
Bed/Stretcher in lowest position, wheels locked, appropriate side rails in place/Call bell, personal items and telephone in reach/Instruct patient to call for assistance before getting out of bed/chair/stretcher/Non-slip footwear applied when patient is off stretcher/[place] to call system/Physically safe environment - no spills, clutter or unnecessary equipment/Purposeful proactive rounding/Room/bathroom lighting operational, light cord in reach

## 2024-11-20 NOTE — ED STATDOCS - CLINICAL SUMMARY MEDICAL DECISION MAKING FREE TEXT BOX
37 y/o male w/ mitchel, appears to be musculoskeletal, not located in kidneys, no CVAT, w/o any chest pain or SOB so doubt cardiac etiology. Will obtain chest x-ray and NSAIDs.

## 2024-11-20 NOTE — ED STATDOCS - PROGRESS NOTE DETAILS
signed Susan Snow PA-C Pt seen initially in intake by Dr Foote. Pt declines  services.   38M c/o bilateral upper back pain just beneath shoulder blades. No trauma, fever, cough/SOB. No modifying factors. Pain has not changed in 2 weeks. cxr NAD. Likely msk pain, toradol in ED. rx motrin and flexeril, f/u PMD. Pt feeling well, pt and family agree with DC and plan of care. return precautions given. pt instructed not to take NSAIDs for 8 hours after toradol injection.

## 2024-11-20 NOTE — ED STATDOCS - PATIENT PORTAL LINK FT
You can access the FollowMyHealth Patient Portal offered by [facility] by registering at the following website: [email or web address]. By joining [email or web address]’s FollowMyHealth portal, you will also be able to view your health information using other applications (apps) compatible with our system.

## 2024-11-20 NOTE — ED STATDOCS - NSFOLLOWUPINSTRUCTIONS_ED_ALL_ED_FT
SIGUE A TU MÉDICO EN 1-2 DÍAS. REGRESE A LA ER PARA CUALQUIER SÍNTOMA PENDIENTE O NUEVAS PREOCUPACIONES.     Distensión lumbar  Lumbar Strain  Thanh distensión lumbar, a veces llamada distensión de la parte baja de la espalda, es un estiramiento o un desgarro en un músculo o en los cordones jamilah de tejido que unen el músculo al hueso (tendones) en la parte inferior de la espalda (columna lumbar). Alaina tipo de lesión ocurre cuando los músculos o los tendones se desgarran o se estiran más allá de medeiros límite.  Las distensiones lumbares puede ser de leves a graves. Las distensiones leves pueden involucrar el estiramiento de un músculo o un tendón sin desgarrarlo. Estas pueden curarse en 1 o 2 semanas. Las distensiones más graves involucran el desgarro de fibras o tendones musculares. Estas causarán más dolor y pueden demorar entre 6 y 8 semanas en curarse.  ¿Cuáles son las causas?  Esta afección puede ser causada por lo siguiente:  Traumatismo, debido, por ejemplo, a thanh caída o a un golpe en el cuerpo.Torsión o hiperdistensión de la espalda. Celada puede ser el resultado de realizar actividades que requieren mucha energía, duncan levantar objetos pesados.¿Qué incrementa el riesgo?  Esta lesión es más frecuente en las siguientes personas:  Atletas.Personas con obesidad.Personas que hacen movimientos repetitivos de levantar, inclinarse u otros movimientos que involucran la espalda.¿Cuáles son los signos o los síntomas?  Los síntomas de esta afección pueden incluir los siguientes:  Dolor valdez o sordo en la parte inferior de la espalda que no desaparece. El dolor se puede extender hacia las nalgas.Rigidez o amplitud de movimientos limitada.Tiene contracciones musculares súbitas (espasmos).¿Cómo se diagnostica?  Esta afección se puede diagnosticar en función de lo siguiente:  Supriya síntomas.Supriya antecedentes médicos.Un examen físico.Pruebas de diagnóstico por imágenes, por ejemplo:  Radiografías.Resonancia magnética (RM).¿Cómo se trata?  El tratamiento de esta afección puede incluir lo siguiente:  Reposo.Aplicar calor y frío en la iveth afectada.Medicamentos de venta andree para aliviar el dolor y la inflamación, duncan antiinflamatorios no esteroideos (PARTHA).Los analgésicos recetados y los relajantes musculares pueden ser necesarios toño un corto tiempo.Realizar fisioterapia.Siga estas instrucciones en medeiros casa:  Control del dolor, el entumecimiento y la hinchazón               Si se lo indican, aplique hielo en la iveth lesionada toño las primeras 24 horas después de producida la lesión.  Ponga el hielo en thanh bolsa plástica.Coloque thanh toalla entre la piel y la bolsa.Coloque el hielo toño 20 minutos, 2 a 3 veces por día.Si se lo indican, aplique calor en la iveth afectada con la frecuencia que le haya indicado el médico. Use la garrett de calor que el médico le recomiende, duncan thanh compresa de calor húmedo o thanh almohadilla térmica.  Coloque thanh toalla entre la piel y la garrett de calor.Aplique calor toño 20 a 30 minutos.Retire la garrett de calor si la piel se pone de color alvares brillante. Celada es especialmente importante si no puede sentir dolor, calor o frío. Puede correr un riesgo mayor de sufrir quemaduras.Actividad     Descanse y retome supriya actividades normales duncan se lo haya indicado el médico. Pregúntele al médico qué actividades son seguras para usted.Tristan ejercicio duncan se lo haya indicado el médico.Medicamentos     [place] los medicamentos de venta andree y los recetados solamente duncan se lo haya indicado el médico.Pregúntele al médico si el medicamento recetado:  Hace que sea necesario que evite conducir o usar maquinaria pesada.Puede causarle estreñimiento. Es posible que tenga que nyla estas medidas para prevenir o tratar el estreñimiento:  Beber suficiente líquido duncan para mantener la orina de color amarillo pálido.Nyla medicamentos recetados o de venta andree.Consumir alimentos ricos en fibra, duncan frijoles, cereales integrales, y frutas y verduras frescas.Limitar medeiros consumo de alimentos ricos en grasa y azúcares procesados, duncan los alimentos fritos o dulces.Prevención de lesiones     Para prevenir thanh lesión futura en la parte baja de la espalda:  Siempre tristan un precalentamiento adecuado antes de la actividad física o de practicar deportes.Relájese y elongue después de hacer actividad física.Practique deportes y levante objetos pesados de forma correcta. Flexione las rodillas antes de levantar objetos pesados.Adopte thanh buena postura mientras esté sentado y de pie.Manténgase en buen estado físico y con un peso saludable.  Tristan por lo menos 150 minutos de ejercicios de intensidad moderada cada semana, duncan caminar a paso ligero o hacer gimnasia acuática.Tristan ejercicios de fuerza al menos 2 veces por semana.Indicaciones generales     No consuma ningún producto que contenga nicotina o tabaco, duncan cigarrillos, cigarrillos electrónicos y tabaco de mascar. Si necesita ayuda para dejar de consumir, consulte al médico.Concurra a todas las visitas de control duncan se lo haya indicado el médico. Celada es importante.Comuníquese con un médico si:  El dolor de espalda no mejora después de 6 semanas de tratamiento.Supriya síntomas empeoran.Solicite ayuda inmediatamente si:  El dolor de espalda es muy intenso.Nota que no puede pararse o caminar.Siente dolor en las piernas.Siente debilidad en las nalgas o en las piernas.Tiene dificultad para controlar la micción o los movimientos intestinales.  Tiene micción frecuente, dolorosa o con shelbie.Tiene thanh temperatura por encima de 101.0 °F (38.3 °C)Resumen  Thanh distensión lumbar, a veces llamada distensión de la parte baja de la espalda, es un estiramiento o un desgarro en un músculo o en los cordones jamilah de tejido que unen el músculo al hueso (tendones) en la parte inferior de la espalda (columna lumbar).Alaina tipo de lesión ocurre cuando los músculos o los tendones se desgarran o se estiran más allá de medeiros límite.Descanse y retome supriya actividades normales duncan se lo haya indicado el médico. Si se lo indican, aplique calor y hielo en la iveth afectada con la frecuencia que le haya indicado el médico.[place] los medicamentos de venta andree y los recetados solamente duncan se lo haya indicado el médico.Comuníquese con un médico si tiene síntomas nuevos o los síntomas empeoran.Esta información no tiene duncan fin reemplazar el consejo del médico. Asegúrese de hacerle al médico cualquier pregunta que tenga.

## 2024-11-20 NOTE — ED STATDOCS - PHYSICAL EXAMINATION
Gen: Awake, Alert, WD, WN, NAD  Head:  NC/AT  Eyes:  PERRL, EOMI, Conjunctiva pink, lids normal, no scleral icterus  ENT: OP clear, no exudates, no erythema, uvula midline, TMs clear bilaterally, moist mucus membranes  Neck: supple, nontender, no meningismus, no JVD, trachea midline  Cardiac/CV:  S1 S2, RRR, no M/G/R  Chest: nontender, no crepitus  Respiratory/Pulm:  CTAB, good air movement, normal resp effort, no wheezes/stridor/retractions/rales/rhonchi  Gastrointestinal/Abdomen:  Soft, nontender, nondistended, +BS, no rebound/guarding  Pelvis: stable, nontender, Hips: FROM, nontender  Back:  b/l tenderness mid back under the scapula. No swelling or redness. NO CVAT  Ext:  warm, well perfused, moving all extremities spontaneously, no cyanosis, no erythema, no edema, distal pulses intact  Skin: intact, no rash, no vesicles, no petechiae, no ecchymosis  Neuro:  AAOx3, sensation intact, motor 5/5 x 4 extremities, normal gait, speech clear

## 2024-11-20 NOTE — ED STATDOCS - OBJECTIVE STATEMENT
39 y/o male w/ PMHx of HLD, renal vascular diease, Urethritis, unilateral orchiectomy, and UTI presenting to the ED c/o constant bilateral back pain x2 weeks. Pt reports  that pain is unrelieved w/ positional changes. Pt denies dysuria, irregular BM, nausea, trauma to the area, vomiting. Pt denies any having this issue before. and pain on inspiration.

## 2024-11-20 NOTE — ED ADULT TRIAGE NOTE - CHIEF COMPLAINT QUOTE
Pt presents to the ED c/o bilateral flank pain x2 weeks. Denies dysuria, nausea, fevers, or SOB. Pt has been taking Tylenol without relief. PMH of HLD.

## 2024-11-21 DIAGNOSIS — E78.5 HYPERLIPIDEMIA, UNSPECIFIED: ICD-10-CM

## 2024-11-21 DIAGNOSIS — Z87.440 PERSONAL HISTORY OF URINARY (TRACT) INFECTIONS: ICD-10-CM

## 2024-11-21 DIAGNOSIS — Z90.79 ACQUIRED ABSENCE OF OTHER GENITAL ORGAN(S): ICD-10-CM

## 2025-04-10 ENCOUNTER — OUTPATIENT (OUTPATIENT)
Dept: OUTPATIENT SERVICES | Facility: HOSPITAL | Age: 40
LOS: 1 days | End: 2025-04-10
Payer: COMMERCIAL

## 2025-04-10 VITALS
WEIGHT: 169.98 LBS | RESPIRATION RATE: 16 BRPM | DIASTOLIC BLOOD PRESSURE: 74 MMHG | TEMPERATURE: 98 F | OXYGEN SATURATION: 97 % | SYSTOLIC BLOOD PRESSURE: 130 MMHG | HEIGHT: 65 IN | HEART RATE: 75 BPM

## 2025-04-10 DIAGNOSIS — K42.0 UMBILICAL HERNIA WITH OBSTRUCTION, WITHOUT GANGRENE: ICD-10-CM

## 2025-04-10 DIAGNOSIS — Z01.818 ENCOUNTER FOR OTHER PREPROCEDURAL EXAMINATION: ICD-10-CM

## 2025-04-10 DIAGNOSIS — Z90.79 ACQUIRED ABSENCE OF OTHER GENITAL ORGAN(S): Chronic | ICD-10-CM

## 2025-04-10 LAB
ANION GAP SERPL CALC-SCNC: 8 MMOL/L — SIGNIFICANT CHANGE UP (ref 5–17)
BUN SERPL-MCNC: 13 MG/DL — SIGNIFICANT CHANGE UP (ref 7–23)
CALCIUM SERPL-MCNC: 9.5 MG/DL — SIGNIFICANT CHANGE UP (ref 8.5–10.1)
CHLORIDE SERPL-SCNC: 106 MMOL/L — SIGNIFICANT CHANGE UP (ref 96–108)
CO2 SERPL-SCNC: 27 MMOL/L — SIGNIFICANT CHANGE UP (ref 22–31)
CREAT SERPL-MCNC: 0.91 MG/DL — SIGNIFICANT CHANGE UP (ref 0.5–1.3)
EGFR: 111 ML/MIN/1.73M2 — SIGNIFICANT CHANGE UP
EGFR: 111 ML/MIN/1.73M2 — SIGNIFICANT CHANGE UP
GLUCOSE SERPL-MCNC: 92 MG/DL — SIGNIFICANT CHANGE UP (ref 70–99)
HCT VFR BLD CALC: 40.4 % — SIGNIFICANT CHANGE UP (ref 39–50)
HGB BLD-MCNC: 14.4 G/DL — SIGNIFICANT CHANGE UP (ref 13–17)
MCHC RBC-ENTMCNC: 30.6 PG — SIGNIFICANT CHANGE UP (ref 27–34)
MCHC RBC-ENTMCNC: 35.6 G/DL — SIGNIFICANT CHANGE UP (ref 32–36)
MCV RBC AUTO: 85.8 FL — SIGNIFICANT CHANGE UP (ref 80–100)
NRBC BLD AUTO-RTO: 0 /100 WBCS — SIGNIFICANT CHANGE UP (ref 0–0)
PLATELET # BLD AUTO: 355 K/UL — SIGNIFICANT CHANGE UP (ref 150–400)
POTASSIUM SERPL-MCNC: 3.8 MMOL/L — SIGNIFICANT CHANGE UP (ref 3.5–5.3)
POTASSIUM SERPL-SCNC: 3.8 MMOL/L — SIGNIFICANT CHANGE UP (ref 3.5–5.3)
RBC # BLD: 4.71 M/UL — SIGNIFICANT CHANGE UP (ref 4.2–5.8)
RBC # FLD: 12.1 % — SIGNIFICANT CHANGE UP (ref 10.3–14.5)
SODIUM SERPL-SCNC: 141 MMOL/L — SIGNIFICANT CHANGE UP (ref 135–145)
WBC # BLD: 8.26 K/UL — SIGNIFICANT CHANGE UP (ref 3.8–10.5)
WBC # FLD AUTO: 8.26 K/UL — SIGNIFICANT CHANGE UP (ref 3.8–10.5)

## 2025-04-10 PROCEDURE — 86900 BLOOD TYPING SEROLOGIC ABO: CPT

## 2025-04-10 PROCEDURE — 86850 RBC ANTIBODY SCREEN: CPT

## 2025-04-10 PROCEDURE — 36415 COLL VENOUS BLD VENIPUNCTURE: CPT

## 2025-04-10 PROCEDURE — 80048 BASIC METABOLIC PNL TOTAL CA: CPT

## 2025-04-10 PROCEDURE — G0463: CPT

## 2025-04-10 PROCEDURE — 86901 BLOOD TYPING SEROLOGIC RH(D): CPT

## 2025-04-10 PROCEDURE — 85027 COMPLETE CBC AUTOMATED: CPT

## 2025-04-10 NOTE — H&P PST ADULT - LAST ECHOCARDIOGRAM
2025
Implemented All Universal Safety Interventions:  [place] to call system. Call bell, personal items and telephone within reach. Instruct patient to call for assistance. Room bathroom lighting operational. Non-slip footwear when patient is off stretcher. Physically safe environment: no spills, clutter or unnecessary equipment. Stretcher in lowest position, wheels locked, appropriate side rails in place.

## 2025-04-10 NOTE — H&P PST ADULT - NSANTHOSAYNRD_GEN_A_CORE
Denies ANGELIQUE/No. ANGELIQUE screening performed.  STOP BANG Legend: 0-2 = LOW Risk; 3-4 = INTERMEDIATE Risk; 5-8 = HIGH Risk

## 2025-04-10 NOTE — H&P PST ADULT - NSICDXPASTMEDICALHX_GEN_ALL_CORE_FT
PAST MEDICAL HISTORY:  Hyperlipidemia     Renal vascular disease     Umbilical hernia with obstruction, without gangrene     Urethritis     UTI (urinary tract infection)      PAST MEDICAL HISTORY:  Adult acne     Hyperlipidemia     Renal vascular disease     Umbilical hernia with obstruction, without gangrene     Urethritis     UTI (urinary tract infection)

## 2025-04-10 NOTE — H&P PST ADULT - HISTORY OF PRESENT ILLNESS
40 yo male with HLD, presents to PST H/O umbilical hernia with pain for the past 3 months, reports small bulging. Rates his pain 5/10, APAP as needed, now  scheduled for a robot assist laparoscopic umbilical hernia repair on 4/17 with Dr. Chase

## 2025-04-10 NOTE — H&P PST ADULT - PROBLEM SELECTOR PLAN 2
Labs  - CBC, BMP, T&S and EKG  No MC needed or requested, pending review of PSTs   Pre op and Hibiclens instructions reviewed and given. Take routine am meds, day of surgery with sip of water. No meds to take am of surgery. Instructed to hold and/or avoid other NSAIDs and OTC supplements. Tylenol is ok. Verbalized understanding Labs  - CBC, BMP, T&S and EKG  Seen and cleared by cardiology  Pre op and Hibiclens instructions reviewed and given in Bulgarian via  Take routine am meds, day of surgery with sip of water. Instructed to hold and/or avoid other NSAIDs and OTC supplements. Tylenol is ok. Verbalized understanding

## 2025-04-16 NOTE — ASU PATIENT PROFILE, ADULT - FALL HARM RISK - UNIVERSAL INTERVENTIONS
Bed in lowest position, wheels locked, appropriate side rails in place/Call bell, personal items and telephone in reach/Instruct patient to call for assistance before getting out of bed or chair/Non-slip footwear when patient is out of bed/[place] to call system/Physically safe environment - no spills, clutter or unnecessary equipment/Purposeful Proactive Rounding/Room/bathroom lighting operational, light cord in reach

## 2025-04-17 ENCOUNTER — TRANSCRIPTION ENCOUNTER (OUTPATIENT)
Age: 40
End: 2025-04-17

## 2025-04-17 ENCOUNTER — OUTPATIENT (OUTPATIENT)
Dept: OUTPATIENT SERVICES | Facility: HOSPITAL | Age: 40
LOS: 1 days | End: 2025-04-17
Payer: COMMERCIAL

## 2025-04-17 VITALS
RESPIRATION RATE: 20 BRPM | OXYGEN SATURATION: 99 % | HEART RATE: 63 BPM | DIASTOLIC BLOOD PRESSURE: 66 MMHG | HEIGHT: 64 IN | SYSTOLIC BLOOD PRESSURE: 119 MMHG | TEMPERATURE: 98 F | WEIGHT: 169.98 LBS

## 2025-04-17 VITALS
RESPIRATION RATE: 16 BRPM | SYSTOLIC BLOOD PRESSURE: 122 MMHG | HEART RATE: 75 BPM | OXYGEN SATURATION: 99 % | DIASTOLIC BLOOD PRESSURE: 76 MMHG

## 2025-04-17 DIAGNOSIS — Z01.818 ENCOUNTER FOR OTHER PREPROCEDURAL EXAMINATION: ICD-10-CM

## 2025-04-17 DIAGNOSIS — Z90.79 ACQUIRED ABSENCE OF OTHER GENITAL ORGAN(S): Chronic | ICD-10-CM

## 2025-04-17 DIAGNOSIS — K42.0 UMBILICAL HERNIA WITH OBSTRUCTION, WITHOUT GANGRENE: ICD-10-CM

## 2025-04-17 PROCEDURE — 49592 RPR AA HRN 1ST < 3 NCR/STRN: CPT

## 2025-04-17 PROCEDURE — C9399: CPT

## 2025-04-17 PROCEDURE — C1781: CPT

## 2025-04-17 PROCEDURE — S2900: CPT

## 2025-04-17 DEVICE — MESH HERNIA VENTRAL SYMBOTEX COMPOSITE ROUND 9CM: Type: IMPLANTABLE DEVICE | Status: FUNCTIONAL

## 2025-04-17 RX ORDER — OXYCODONE HYDROCHLORIDE 30 MG/1
5 TABLET ORAL ONCE
Refills: 0 | Status: DISCONTINUED | OUTPATIENT
Start: 2025-04-17 | End: 2025-04-17

## 2025-04-17 RX ORDER — ROSUVASTATIN CALCIUM 5 MG/1
1 TABLET, FILM COATED ORAL
Refills: 0 | DISCHARGE

## 2025-04-17 RX ORDER — DOCUSATE SODIUM 100 MG
1 CAPSULE ORAL
Qty: 15 | Refills: 0
Start: 2025-04-17

## 2025-04-17 RX ORDER — ACETAMINOPHEN 500 MG/5ML
2 LIQUID (ML) ORAL
Qty: 20 | Refills: 0
Start: 2025-04-17

## 2025-04-17 RX ORDER — OXYCODONE HYDROCHLORIDE 30 MG/1
1 TABLET ORAL
Qty: 8 | Refills: 0
Start: 2025-04-17

## 2025-04-17 RX ORDER — SODIUM CHLORIDE 9 G/1000ML
1000 INJECTION, SOLUTION INTRAVENOUS
Refills: 0 | Status: DISCONTINUED | OUTPATIENT
Start: 2025-04-17 | End: 2025-04-17

## 2025-04-17 RX ORDER — DOXYCYCLINE HYCLATE 100 MG
1 TABLET ORAL
Refills: 0 | DISCHARGE

## 2025-04-17 RX ORDER — CEFAZOLIN SODIUM IN 0.9 % NACL 3 G/100 ML
2000 INTRAVENOUS SOLUTION, PIGGYBACK (ML) INTRAVENOUS ONCE
Refills: 0 | Status: COMPLETED | OUTPATIENT
Start: 2025-04-17 | End: 2025-04-17

## 2025-04-17 RX ORDER — HYDROMORPHONE/SOD CHLOR,ISO/PF 2 MG/10 ML
0.5 SYRINGE (ML) INJECTION
Refills: 0 | Status: DISCONTINUED | OUTPATIENT
Start: 2025-04-17 | End: 2025-04-17

## 2025-04-17 RX ORDER — ONDANSETRON HCL/PF 4 MG/2 ML
4 VIAL (ML) INJECTION ONCE
Refills: 0 | Status: COMPLETED | OUTPATIENT
Start: 2025-04-17 | End: 2025-04-17

## 2025-04-17 RX ORDER — SODIUM CHLORIDE 9 G/1000ML
1000 INJECTION, SOLUTION INTRAVENOUS
Refills: 0 | Status: ACTIVE | OUTPATIENT
Start: 2025-04-17 | End: 2025-04-18

## 2025-04-17 RX ORDER — IBUPROFEN 200 MG
1 TABLET ORAL
Qty: 10 | Refills: 0
Start: 2025-04-17

## 2025-04-17 RX ADMIN — Medication 4 MILLIGRAM(S): at 15:29

## 2025-04-17 RX ADMIN — SODIUM CHLORIDE 75 MILLILITER(S): 9 INJECTION, SOLUTION INTRAVENOUS at 15:29

## 2025-04-17 NOTE — ASU DISCHARGE PLAN (ADULT/PEDIATRIC) - FINANCIAL ASSISTANCE
[facility] provides services at a reduced cost to those who are determined to be eligible through [facility]’s financial assistance program. Information regarding [facility]’s financial assistance program can be found by going to [email or web address] or by calling [phone].

## 2025-04-17 NOTE — BRIEF OPERATIVE NOTE - NSICDXBRIEFPROCEDURE_GEN_ALL_CORE_FT
PROCEDURES:  Robot-assisted single incision laparoscopic left inguinal hernia repair using da Santhosh SP and mesh, with repair of umbilical hernia 17-Apr-2025 15:07:10  Nik Chase

## 2025-04-17 NOTE — ASU DISCHARGE PLAN (ADULT/PEDIATRIC) - CARE PROVIDER_API CALL
Nik Chase [facility]  [address], [address]  [place] [address]  Phone: [phone]  Fax: [phone]  Follow Up Time:

## 2025-04-17 NOTE — ASU DISCHARGE PLAN (ADULT/PEDIATRIC) - NS MD DC FALL RISK RISK
For information on Fall & Injury Prevention, visit: [email or web address] OR  [email or web address] OR  https://www.cdc.gov/steadi/patient.html

## 2025-04-17 NOTE — ASU DISCHARGE PLAN (ADULT/PEDIATRIC) - NURSING INSTRUCTIONS
May take next Tylenol dose at 8pm as needed for pain.  May take next Motrin dose at 9pm as needed for pain.

## 2025-04-17 NOTE — ASU DISCHARGE PLAN (ADULT/PEDIATRIC) - ASU DC SPECIAL INSTRUCTIONSFT
Follow up with Dr. Chase in 1-2 weeks. Call the office at the number below to schedule your appointment. You may shower; soap and water over incision sites. Do not scrub. Pat dry when done. No tub bathing or swimming until cleared. Keep incision sites out of the sun as scars will darken. Ambulate as tolerated, but no heavy lifting (>10lbs) or strenuous exercise. You may resume regular diet. You should be urinating at least 3-4x per day. Call the office if you experience increasing abdominal pain, nausea, vomiting, or temperature >101 F.

## 2025-05-01 NOTE — ASU PATIENT PROFILE, ADULT - URINARY CATHETER
Occupational Therapy    Evaluation    Patient Name: Marylou Cordero  MRN: [id number]  Today's Date: 5/1/2025  Time Calculation  Start Time: 1129  Stop Time: 1156  Time Calculation (min): 27 min  4127/4127-A  Eval only     Assessment  IP OT Assessment  Prognosis: Fair  End of Session Communication: Bedside nurse  End of Session Patient Position: Up in chair, Alarm on  Patient presents with decline in ADLs, functional transfers, and functional mobility tasks and would benefit from OT during acute stay to maximize functional independence and safety.  Patient requires 24 hours hands on assistance.  Recommend moderate to low intensity OT to maximize functional independence and safety.     Plan:  Treatment Interventions: ADL retraining, Functional transfer training, Patient/family training, Equipment evaluation/education, Compensatory technique education  OT Frequency: Daily  OT Discharge Recommendations: Moderate intensity level of continued care (vs low intensity pending progress)  Equipment Recommended upon Discharge: Wheeled walker (shower chair, sock aide)  OT - OK to Discharge: Yes  from acute care OT services to the next level of care when cleared by medical team      Subjective     Current Problem:  No diagnosis found.    General:  General  Reason for Referral: TKA  Referred By: Dr. Vaca  Past Medical History Relevant to Rehab: Admitted 4/30/2025 after having left TKA.  PMH: anemia, anxiety, arthritis, CAD, DVT, GERD, migraines, meningioma, HTN, pericarditis, RA, SLE  Co-Treatment:  (PTA)  Prior to Session Communication: Bedside nurse  Patient Position Received: Bed, 2 rail up, Alarm on  Preferred Learning Style: verbal  General Comment: Patient in supine in bed and agreeable to participate in OT evaluation with encouragement    Precautions:  LE Weight Bearing Status: Weight Bearing as Tolerated  Medical Precautions: Fall precautions  Post-Surgical Precautions: Left total knee precautions    Pain:  Pain  Assessment  Pain Assessment: 0-10  0-10 (Numeric) Pain Score: 10 - Worst possible pain  Pain Type: Surgical pain  Pain Location: Knee  Pain Orientation: Left  Pain Interventions: Rest    Objective   Cognition:  Overall Cognitive Status: Within Functional Limits    Home Living:  Type of Home: House  Lives With:  (family)  Home Adaptive Equipment: Walker rolling or standard, Cane  Home Layout: Able to live on main level with bedroom/bathroom, Two level (4 steps down to main living area)  Home Access: No concerns  Bathroom Shower/Tub: Walk-in shower  Bathroom Equipment: Shower chair with back     Prior Function:  ADL Assistance: Needs assistance  Bath: Minimal  Homemaking Assistance: Needs assistance  Ambulatory Assistance: Independent (cane)    ADL:  UE Dressing Assistance: Independent (don pull over shirt)  LE Dressing Assistance: Minimal (don underwear and pants with max verbal cues for technique)    Activity Tolerance:  Endurance: Decreased tolerance for upright activites (limited by pain)    Bed Mobility/Transfers:   Bed Mobility  Bed Mobility:  (SBA supine to sit with increaed effort to complete)  Transfers  Transfer:  (CGA sit <> stand with min verbal cues for safety and technique)  Patient moaning out in pain with all activity; however, completes all functional tasks.     Ambulation/Gait Training:  Functional Mobility  Functional Mobility Performed:  (CGA with WW functional mobility task)    Extremities: RUE   RUE : Within Functional Limits and LUE   LUE: Within Functional Limits    Outcome Measures: [facility] Daily Activity  Putting on and taking off regular lower body clothing: A little  Bathing (including washing, rinsing, drying): A little  Putting on and taking off regular upper body clothing: A little  Toileting, which includes using toilet, bedpan or urinal: A little  Taking care of personal grooming such as brushing teeth: A little  Eating Meals: None  Daily Activity - Total Score:  19    EDUCATION:  Education  Individual(s) Educated: Patient  Education Provided: Fall precautons  Patient Response to Education:  (needs additional instruction)      Goals:   Encounter Problems       Encounter Problems (Active)       Dressings Lower Extremities       STG - Patient will complete lower body dressing independently with AE and comp strategies (Progressing)       Start:  05/01/25    Expected End:  05/15/25               Functional Balance       STG-Patient will be independent with assistive device dynamic stand task >5 minutes for ADL completion   (Progressing)       Start:  05/01/25    Expected End:  05/15/25               Functional Mobility       STG-Patient will be independent with assistive device functional mobility tasks   (Progressing)       Start:  05/01/25    Expected End:  05/15/25               OT Transfers       STG - Patient will perform car transfers independently demonstrating good safety  (Progressing)       Start:  05/01/25    Expected End:  05/15/25            STG-Patient will be independent with functional transfers demonstrating good safety   (Progressing)       Start:  05/01/25    Expected End:  05/15/25                       no

## 2025-05-18 ENCOUNTER — EMERGENCY (EMERGENCY)
Facility: HOSPITAL | Age: 40
LOS: 1 days | End: 2025-05-18
Attending: EMERGENCY MEDICINE | Admitting: EMERGENCY MEDICINE
Payer: COMMERCIAL

## 2025-05-18 VITALS
HEIGHT: 64 IN | TEMPERATURE: 98 F | WEIGHT: 169.98 LBS | OXYGEN SATURATION: 97 % | RESPIRATION RATE: 20 BRPM | HEART RATE: 84 BPM | SYSTOLIC BLOOD PRESSURE: 137 MMHG | DIASTOLIC BLOOD PRESSURE: 78 MMHG

## 2025-05-18 DIAGNOSIS — Z90.79 ACQUIRED ABSENCE OF OTHER GENITAL ORGAN(S): Chronic | ICD-10-CM

## 2025-05-18 LAB
ALBUMIN SERPL ELPH-MCNC: 3.7 G/DL — SIGNIFICANT CHANGE UP (ref 3.3–5)
ALP SERPL-CCNC: 64 U/L — SIGNIFICANT CHANGE UP (ref 40–120)
ALT FLD-CCNC: 90 U/L — HIGH (ref 12–78)
ANION GAP SERPL CALC-SCNC: 7 MMOL/L — SIGNIFICANT CHANGE UP (ref 5–17)
AST SERPL-CCNC: 26 U/L — SIGNIFICANT CHANGE UP (ref 15–37)
BILIRUB SERPL-MCNC: 0.5 MG/DL — SIGNIFICANT CHANGE UP (ref 0.2–1.2)
BUN SERPL-MCNC: 14 MG/DL — SIGNIFICANT CHANGE UP (ref 7–23)
CALCIUM SERPL-MCNC: 8.8 MG/DL — SIGNIFICANT CHANGE UP (ref 8.5–10.1)
CHLORIDE SERPL-SCNC: 109 MMOL/L — HIGH (ref 96–108)
CO2 SERPL-SCNC: 24 MMOL/L — SIGNIFICANT CHANGE UP (ref 22–31)
CREAT SERPL-MCNC: 1 MG/DL — SIGNIFICANT CHANGE UP (ref 0.5–1.3)
EGFR: 99 ML/MIN/1.73M2 — SIGNIFICANT CHANGE UP
EGFR: 99 ML/MIN/1.73M2 — SIGNIFICANT CHANGE UP
GLUCOSE SERPL-MCNC: 144 MG/DL — HIGH (ref 70–99)
HCT VFR BLD CALC: 40.5 % — SIGNIFICANT CHANGE UP (ref 39–50)
HGB BLD-MCNC: 14.5 G/DL — SIGNIFICANT CHANGE UP (ref 13–17)
MCHC RBC-ENTMCNC: 30.2 PG — SIGNIFICANT CHANGE UP (ref 27–34)
MCHC RBC-ENTMCNC: 35.8 G/DL — SIGNIFICANT CHANGE UP (ref 32–36)
MCV RBC AUTO: 84.4 FL — SIGNIFICANT CHANGE UP (ref 80–100)
NRBC BLD AUTO-RTO: 0 /100 WBCS — SIGNIFICANT CHANGE UP (ref 0–0)
PLATELET # BLD AUTO: 333 K/UL — SIGNIFICANT CHANGE UP (ref 150–400)
POTASSIUM SERPL-MCNC: 3.8 MMOL/L — SIGNIFICANT CHANGE UP (ref 3.5–5.3)
POTASSIUM SERPL-SCNC: 3.8 MMOL/L — SIGNIFICANT CHANGE UP (ref 3.5–5.3)
PROT SERPL-MCNC: 7.1 G/DL — SIGNIFICANT CHANGE UP (ref 6–8.3)
RBC # BLD: 4.8 M/UL — SIGNIFICANT CHANGE UP (ref 4.2–5.8)
RBC # FLD: 12 % — SIGNIFICANT CHANGE UP (ref 10.3–14.5)
SODIUM SERPL-SCNC: 140 MMOL/L — SIGNIFICANT CHANGE UP (ref 135–145)
WBC # BLD: 8.78 K/UL — SIGNIFICANT CHANGE UP (ref 3.8–10.5)
WBC # FLD AUTO: 8.78 K/UL — SIGNIFICANT CHANGE UP (ref 3.8–10.5)

## 2025-05-18 PROCEDURE — 99284 EMERGENCY DEPT VISIT MOD MDM: CPT | Mod: 25

## 2025-05-18 PROCEDURE — 80053 COMPREHEN METABOLIC PANEL: CPT

## 2025-05-18 PROCEDURE — 74177 CT ABD & PELVIS W/CONTRAST: CPT

## 2025-05-18 PROCEDURE — 96374 THER/PROPH/DIAG INJ IV PUSH: CPT | Mod: XU

## 2025-05-18 PROCEDURE — 36415 COLL VENOUS BLD VENIPUNCTURE: CPT

## 2025-05-18 PROCEDURE — 74177 CT ABD & PELVIS W/CONTRAST: CPT | Mod: 26

## 2025-05-18 PROCEDURE — 99285 EMERGENCY DEPT VISIT HI MDM: CPT

## 2025-05-18 PROCEDURE — 85027 COMPLETE CBC AUTOMATED: CPT

## 2025-05-18 RX ORDER — KETOROLAC TROMETHAMINE 30 MG/ML
15 INJECTION, SOLUTION INTRAMUSCULAR; INTRAVENOUS ONCE
Refills: 0 | Status: DISCONTINUED | OUTPATIENT
Start: 2025-05-18 | End: 2025-05-18

## 2025-05-18 RX ADMIN — KETOROLAC TROMETHAMINE 15 MILLIGRAM(S): 30 INJECTION, SOLUTION INTRAMUSCULAR; INTRAVENOUS at 22:44

## 2025-05-18 RX ADMIN — KETOROLAC TROMETHAMINE 15 MILLIGRAM(S): 30 INJECTION, SOLUTION INTRAMUSCULAR; INTRAVENOUS at 23:30

## 2025-05-18 NOTE — ED ADULT NURSE NOTE - NSFALLUNIVINTERV_ED_ALL_ED
Bed/Stretcher in lowest position, wheels locked, appropriate side rails in place/Call bell, personal items and telephone in reach/Instruct patient to call for assistance before getting out of bed/chair/stretcher/Non-slip footwear applied when patient is off stretcher/[place] to call system/Physically safe environment - no spills, clutter or unnecessary equipment/Purposeful proactive rounding/Room/bathroom lighting operational, light cord in reach

## 2025-05-18 NOTE — ED PROVIDER NOTE - CLINICAL SUMMARY MEDICAL DECISION MAKING FREE TEXT BOX
Patient with tender to palpation over RLQ laparoscopic surgical scar.  Likely seroma.  Plan for labs, CT and reassess. Patient with tender to palpation over RLQ laparoscopic surgical scar.  Likely seroma.  Plan for labs, CT and reassess.    CT negative, reviewed results with patient.  Feels compo going home at this point.  Also with increased stool burden, suggested patient take MiraLAX over-the-counter.  Patient comfortable plan, return precautions given.

## 2025-05-18 NOTE — ED ADULT NURSE NOTE - OBJECTIVE STATEMENT
Pt arrived to ED c/o RLQ abd pain started about 4 days ago. Pt s/p umbilical hernia repair about 3 weeks ago, states area feels more swollen now w/ worsening pain. Slight tenderness noted to palpation. Pt expresses no other complaints at this time. Labs sent, pt sent to CT, will medicate upon return. Safety maintained, nursing care ongoing.

## 2025-05-18 NOTE — ED PROVIDER NOTE - OBJECTIVE STATEMENT
39-year-old male no past medical history presenting with right lower quadrant pain over the last few days.  Patient had umbilical hernia repair April 17, has been doing well postsurgical but is reporting right lower quadrant pain over the last few days.  Denies nausea, vomiting, diarrhea, fever, dysuria, hematuria

## 2025-05-18 NOTE — ED PROVIDER NOTE - PATIENT PORTAL LINK FT
You can access the FollowMyHealth Patient Portal offered by [facility] by registering at the following website: [email or web address] By joining [employer]’s FollowMyHealth portal, you will also be able to view your health information using other applications (apps) compatible with our system.

## 2025-05-18 NOTE — ED PROVIDER NOTE - NSFOLLOWUPINSTRUCTIONS_ED_ALL_ED_FT
- Follow up with your doctor within 2-3 days.   - Bring results with you to the appointment.   - Take Miralax 17g once per day as needed for constipation. Miralax is available over the counter.  - Return to the ED for any new or worsening symptoms.     Abdominal Pain    Many things can cause abdominal pain. Many times, abdominal pain is not caused by a disease and will improve without treatment. Your health care provider will do a physical exam to determine if there is a dangerous cause of your pain; blood tests and imaging may help determine the cause of your pain. However, in many cases, no cause may be found and you may need further testing as an outpatient. Monitor your abdominal pain for any changes.     SEEK IMMEDIATE MEDICAL CARE IF YOU HAVE ANY OF THE FOLLOWING SYMPTOMS: worsening abdominal pain, uncontrollable vomiting, profuse diarrhea, inability to have bowel movements or pass gas, black or bloody stools, fever accompanying chest pain or back pain, or fainting. These symptoms may represent a serious problem that is an emergency. Do not wait to see if the symptoms will go away. Get medical help right away. Call 911 and do not drive yourself to the hospital.

## 2025-05-18 NOTE — ED PROVIDER NOTE - PHYSICAL EXAMINATION
Gen: Well appearing in NAD  Head: NC/AT  Neck: trachea midline  Resp:  No distress, CTAB  CV: RRR  GI: soft,  surgical scars well-healed, clean dry intact, tender palpation over right lower quadrant surgical scar.  No McBurney's tenderness.  Ext: no deformities  Neuro:  A&O appears non focal  Skin:  Warm and dry as visualized  Psych:  Normal affect and mood

## 2025-05-18 NOTE — ED ADULT TRIAGE NOTE - CHIEF COMPLAINT QUOTE
Patient complaining of RLQ abdominal pain x4 days s/p umbilical hernia repair about a month ago, states feels more swollen. Denies fevers, SOB, CP, nausea and vomiting

## 2025-05-18 NOTE — ED PROVIDER NOTE - NSICDXPASTMEDICALHX_GEN_ALL_CORE_FT
PAST MEDICAL HISTORY:  Adult acne     Hyperlipidemia     Renal vascular disease     Umbilical hernia with obstruction, without gangrene     Urethritis     UTI (urinary tract infection)

## 2025-05-18 NOTE — ED PROVIDER NOTE - PRO INTERPRETER NEED 2
Used current dosing wt of 89 kg (3/13) for caloric/protein needs in consideration of advanced age, pressure injuries.   Defer fluid needs to team.  English

## 2025-05-19 VITALS
HEART RATE: 67 BPM | OXYGEN SATURATION: 97 % | DIASTOLIC BLOOD PRESSURE: 69 MMHG | RESPIRATION RATE: 18 BRPM | SYSTOLIC BLOOD PRESSURE: 109 MMHG | TEMPERATURE: 98 F

## 2025-05-19 PROBLEM — K42.0 UMBILICAL HERNIA WITH OBSTRUCTION, WITHOUT GANGRENE: Chronic | Status: ACTIVE | Noted: 2025-04-10

## 2025-05-19 PROBLEM — L70.9 ACNE, UNSPECIFIED: Chronic | Status: ACTIVE | Noted: 2025-04-10

## (undated) DEVICE — XI CORD MONOPOLAR CAUTERY (GREEN)

## (undated) DEVICE — PLV/PSP-SUCTION IRRIGATOR STRYKER: Type: DURABLE MEDICAL EQUIPMENT

## (undated) DEVICE — GOWN SMARTGOWN RAGLAN XLG

## (undated) DEVICE — SUT MONOCRYL 4-0 27" PS-2 UNDYED

## (undated) DEVICE — SUT STRATAFX PDS PLUS SYMMETRIC 2-0 6" CT-2

## (undated) DEVICE — Device

## (undated) DEVICE — XI SCISSOR TIP COVER

## (undated) DEVICE — DRSG DERMABOND 0.7ML

## (undated) DEVICE — D HELP - CLEARVIEW CLEARIFY SYSTEM

## (undated) DEVICE — DRAPE TOWEL BLUE 17" X 24"

## (undated) DEVICE — NDL HYPO SAFE 25G X 1.5" (ORANGE)

## (undated) DEVICE — SUT VLOC 180 3-0 6" V-20 GREEN

## (undated) DEVICE — XI OBTURATOR OPTICAL BLADELESS 8MM

## (undated) DEVICE — SUT VLOC 180 3-0 12" V-20 GREEN

## (undated) DEVICE — VENODYNE/SCD SLEEVE CALF MEDIUM

## (undated) DEVICE — VENODYNE/SCD SLEEVE CALF LARGE

## (undated) DEVICE — SOL IRR POUR NS 0.9% 1000ML

## (undated) DEVICE — TUBING STRYKER PNEUMOSURE HEATED RTP

## (undated) DEVICE — ELCTR BOVIE TIP BLADE INSULATED 2.75" EDGE

## (undated) DEVICE — SOL IRR POUR H2O 1000ML

## (undated) DEVICE — DRSG OPSITE 2.5 X 2"

## (undated) DEVICE — XI DRAPE COLUMN

## (undated) DEVICE — XI DRAPE ARM

## (undated) DEVICE — PACK GENERAL LAPAROSCOPY

## (undated) DEVICE — XI CORD BIPOLAR CAUTERY (BLUE)

## (undated) DEVICE — FOLEY TRAY 16FR LF URINE METER SURESTEP

## (undated) DEVICE — XI ENDOWRIST SUCTION IRRIGATOR 8MM

## (undated) DEVICE — XI CANNULA SEAL 5MM - 8 MM

## (undated) DEVICE — PLV-SCD MACHINE: Type: DURABLE MEDICAL EQUIPMENT

## (undated) DEVICE — PLV/PSP-ESU FORCE2 FIL 16736T: Type: DURABLE MEDICAL EQUIPMENT

## (undated) DEVICE — DRAPE 3/4 SHEET W REINFORCEMENT 56X77"

## (undated) DEVICE — SOL IRR BAG NS 0.9% 1000ML

## (undated) DEVICE — XI ARM NEEDLE DRIVER SUTURECUT MEGA 8MM

## (undated) DEVICE — ELCTR BOVIE PENCIL SMOKE EVACUATION

## (undated) DEVICE — SUT SURGIPRO 0 30" GS-22

## (undated) DEVICE — GLV 7.5 PROTEXIS (WHITE)

## (undated) DEVICE — XI ARM SCISSOR MONO CURVED

## (undated) DEVICE — DRSG STERISTRIPS 0.5 X 4"

## (undated) DEVICE — WARMING BLANKET UPPER ADULT

## (undated) DEVICE — XI ARM FORCEP FENESTRATED BIPOLAR 8MM

## (undated) DEVICE — TROCAR COVIDIEN VERSAPORT BLADELESS OPTICAL 5MM STANDARD